# Patient Record
Sex: FEMALE | Race: WHITE | URBAN - METROPOLITAN AREA
[De-identification: names, ages, dates, MRNs, and addresses within clinical notes are randomized per-mention and may not be internally consistent; named-entity substitution may affect disease eponyms.]

---

## 2018-01-05 ENCOUNTER — APPOINTMENT (RX ONLY)
Dept: URBAN - METROPOLITAN AREA CLINIC 20 | Facility: CLINIC | Age: 63
Setting detail: DERMATOLOGY
End: 2018-01-05

## 2018-01-05 DIAGNOSIS — L57.0 ACTINIC KERATOSIS: ICD-10-CM

## 2018-01-05 DIAGNOSIS — D22 MELANOCYTIC NEVI: ICD-10-CM

## 2018-01-05 DIAGNOSIS — L57.8 OTHER SKIN CHANGES DUE TO CHRONIC EXPOSURE TO NONIONIZING RADIATION: ICD-10-CM

## 2018-01-05 DIAGNOSIS — Z85.828 PERSONAL HISTORY OF OTHER MALIGNANT NEOPLASM OF SKIN: ICD-10-CM

## 2018-01-05 DIAGNOSIS — L82.1 OTHER SEBORRHEIC KERATOSIS: ICD-10-CM

## 2018-01-05 DIAGNOSIS — L81.4 OTHER MELANIN HYPERPIGMENTATION: ICD-10-CM

## 2018-01-05 DIAGNOSIS — Z12.83 ENCOUNTER FOR SCREENING FOR MALIGNANT NEOPLASM OF SKIN: ICD-10-CM

## 2018-01-05 DIAGNOSIS — D18.0 HEMANGIOMA: ICD-10-CM

## 2018-01-05 PROBLEM — I10 ESSENTIAL (PRIMARY) HYPERTENSION: Status: ACTIVE | Noted: 2018-01-05

## 2018-01-05 PROBLEM — E13.9 OTHER SPECIFIED DIABETES MELLITUS WITHOUT COMPLICATIONS: Status: ACTIVE | Noted: 2018-01-05

## 2018-01-05 PROBLEM — E78.5 HYPERLIPIDEMIA, UNSPECIFIED: Status: ACTIVE | Noted: 2018-01-05

## 2018-01-05 PROBLEM — D22.5 MELANOCYTIC NEVI OF TRUNK: Status: ACTIVE | Noted: 2018-01-05

## 2018-01-05 PROBLEM — D18.01 HEMANGIOMA OF SKIN AND SUBCUTANEOUS TISSUE: Status: ACTIVE | Noted: 2018-01-05

## 2018-01-05 PROCEDURE — ? COUNSELING

## 2018-01-05 PROCEDURE — 99203 OFFICE O/P NEW LOW 30 MIN: CPT | Mod: 25

## 2018-01-05 PROCEDURE — 17003 DESTRUCT PREMALG LES 2-14: CPT

## 2018-01-05 PROCEDURE — ? LIQUID NITROGEN

## 2018-01-05 PROCEDURE — 17000 DESTRUCT PREMALG LESION: CPT

## 2018-01-05 ASSESSMENT — LOCATION ZONE DERM
LOCATION ZONE: TRUNK
LOCATION ZONE: SCALP
LOCATION ZONE: NOSE
LOCATION ZONE: FACE
LOCATION ZONE: ARM

## 2018-01-05 ASSESSMENT — LOCATION SIMPLE DESCRIPTION DERM
LOCATION SIMPLE: LEFT CHEEK
LOCATION SIMPLE: RIGHT CHEEK
LOCATION SIMPLE: ABDOMEN
LOCATION SIMPLE: NOSE
LOCATION SIMPLE: SCALP
LOCATION SIMPLE: RIGHT UPPER ARM
LOCATION SIMPLE: CHEST
LOCATION SIMPLE: LEFT UPPER ARM
LOCATION SIMPLE: LEFT TEMPLE
LOCATION SIMPLE: RIGHT EYEBROW

## 2018-01-05 ASSESSMENT — LOCATION DETAILED DESCRIPTION DERM
LOCATION DETAILED: RIGHT CENTRAL EYEBROW
LOCATION DETAILED: LEFT ANTERIOR PROXIMAL UPPER ARM
LOCATION DETAILED: PERIUMBILICAL SKIN
LOCATION DETAILED: RIGHT CENTRAL MALAR CHEEK
LOCATION DETAILED: NASAL DORSUM
LOCATION DETAILED: UPPER STERNUM
LOCATION DETAILED: LEFT CENTRAL TEMPLE
LOCATION DETAILED: RIGHT SUPERIOR PARIETAL SCALP
LOCATION DETAILED: RIGHT ANTERIOR PROXIMAL UPPER ARM
LOCATION DETAILED: LEFT CENTRAL MALAR CHEEK

## 2018-01-05 NOTE — PROCEDURE: LIQUID NITROGEN
Detail Level: Detailed
Consent: The patient's consent was obtained including but not limited to risks of crusting, scabbing, blistering, scarring, darker or lighter pigmentary change, recurrence, incomplete removal and infection.
Post-Care Instructions: I reviewed with the patient in detail post-care instructions. Patient is to wear sun protection and avoid picking at any of the treated lesions. Pt may apply Vaseline to crusted or scabbing areas.
Render Post-Care Instructions In Note?: yes
Duration Of Freeze Thaw-Cycle (Seconds): 0

## 2018-03-06 ENCOUNTER — HOSPITAL ENCOUNTER (OUTPATIENT)
Dept: RADIOLOGY | Facility: MEDICAL CENTER | Age: 63
End: 2018-03-06

## 2018-03-19 ENCOUNTER — HOSPITAL ENCOUNTER (OUTPATIENT)
Dept: RADIOLOGY | Facility: MEDICAL CENTER | Age: 63
End: 2018-03-19
Attending: FAMILY MEDICINE
Payer: COMMERCIAL

## 2018-03-19 DIAGNOSIS — Z12.31 ENCOUNTER FOR SCREENING MAMMOGRAM FOR MALIGNANT NEOPLASM OF BREAST: ICD-10-CM

## 2018-03-19 PROCEDURE — 77063 BREAST TOMOSYNTHESIS BI: CPT

## 2018-03-30 ENCOUNTER — TELEPHONE (OUTPATIENT)
Dept: URGENT CARE | Facility: PHYSICIAN GROUP | Age: 63
End: 2018-03-30

## 2018-03-30 ENCOUNTER — OFFICE VISIT (OUTPATIENT)
Dept: URGENT CARE | Facility: PHYSICIAN GROUP | Age: 63
End: 2018-03-30
Payer: COMMERCIAL

## 2018-03-30 VITALS
TEMPERATURE: 99.2 F | HEART RATE: 102 BPM | WEIGHT: 189 LBS | BODY MASS INDEX: 30.37 KG/M2 | OXYGEN SATURATION: 92 % | SYSTOLIC BLOOD PRESSURE: 120 MMHG | HEIGHT: 66 IN | DIASTOLIC BLOOD PRESSURE: 60 MMHG

## 2018-03-30 DIAGNOSIS — H66.91 ACUTE OTITIS MEDIA, RIGHT: Primary | ICD-10-CM

## 2018-03-30 DIAGNOSIS — J06.9 URI, ACUTE: ICD-10-CM

## 2018-03-30 DIAGNOSIS — H66.91 ACUTE OTITIS MEDIA, RIGHT: ICD-10-CM

## 2018-03-30 PROCEDURE — 99203 OFFICE O/P NEW LOW 30 MIN: CPT | Performed by: PHYSICIAN ASSISTANT

## 2018-03-30 RX ORDER — AZITHROMYCIN 250 MG/1
TABLET, FILM COATED ORAL
Qty: 6 TAB | Refills: 0 | Status: SHIPPED | OUTPATIENT
Start: 2018-03-30 | End: 2018-04-24

## 2018-03-30 RX ORDER — AMOXICILLIN 875 MG/1
875 TABLET, COATED ORAL 2 TIMES DAILY
Qty: 20 TAB | Refills: 0 | Status: SHIPPED | OUTPATIENT
Start: 2018-03-30 | End: 2018-04-24

## 2018-03-30 ASSESSMENT — ENCOUNTER SYMPTOMS
SPUTUM PRODUCTION: 0
COUGH: 1
HEADACHES: 1
SORE THROAT: 1
SINUS PAIN: 0
FEVER: 1

## 2018-03-30 NOTE — PROGRESS NOTES
Subjective:      Alisa Pena is a 62 y.o. female who presents with Ear Pain (Bilateral ear pain, cough, congestion, sore throat, x4 days )    PMH:  has a past medical history of Cancer (CMS-HCC) (2009); Diabetes (CMS-HCC); and Hypertension.  MEDS:   Current Outpatient Prescriptions:   •  amoxicillin (AMOXIL) 875 MG tablet, Take 1 Tab by mouth 2 times a day., Disp: 20 Tab, Rfl: 0  •  omeprazole (PRILOSEC) 20 MG delayed-release capsule, Take 20 mg by mouth 2 Times a Day., Disp: , Rfl:   •  metformin (GLUCOPHAGE) 500 MG Tab, Take 500 mg by mouth 2 times a day, with meals., Disp: , Rfl:   •  lisinopril (PRINIVIL) 20 MG Tab, Take 20 mg by mouth every day., Disp: , Rfl:   •  atorvastatin (LIPITOR) 10 MG Tab, Take 10 mg by mouth every evening., Disp: , Rfl:   •  aspirin EC (ECOTRIN) 81 MG Tablet Delayed Response, Take 81 mg by mouth every day., Disp: , Rfl:   ALLERGIES: No Known Allergies  SURGHX:   Past Surgical History:   Procedure Laterality Date   • GANGLION EXCISION Right 12/23/2016    Procedure: GANGLION EXCISION - DORSAL WRIST;  Surgeon: Marquez Jauregui M.D.;  Location: SURGERY [facility];  Service:    • HAND SURGERY Right 2004    thumb and wrist   • HYSTERECTOMY, TOTAL ABDOMINAL  1992   • APPENDECTOMY  1990   • SHOULDER ARTHROSCOPY Right 1998/2002    rotator cuff repair     SOCHX:  reports that she has never smoked. She has never used smokeless tobacco. She reports that she drinks alcohol. She reports that she does not use drugs.  FH:  Reviewed with patient/family. Not pertinent to this complaint.             Otalgia    There is pain in both ears. This is a new problem. The current episode started in the past 7 days. The problem occurs constantly. There has been no fever. Associated symptoms include coughing, headaches and a sore throat. She has tried NSAIDs and heat packs (sudafed) for the symptoms. The treatment provided mild relief.       Review of Systems   Constitutional: Positive for fever.  "  HENT: Positive for congestion, ear pain and sore throat. Negative for sinus pain.    Respiratory: Positive for cough. Negative for sputum production.    Neurological: Positive for headaches.   All other systems reviewed and are negative.         Objective:     /60   Pulse (!) 102   Temp 37.3 °C (99.2 °F)   Ht 1.676 m (5' 6\")   Wt 85.7 kg (189 lb)   SpO2 92%   BMI 30.51 kg/m²      Physical Exam   Constitutional: She is oriented to person, place, and time. She appears well-developed and well-nourished. No distress.   HENT:   Head: Normocephalic and atraumatic.   Right Ear: Tympanic membrane is injected, erythematous and bulging. A middle ear effusion is present.   Left Ear: Tympanic membrane normal.   Nose: Mucosal edema and rhinorrhea present.   Mouth/Throat: Uvula is midline, oropharynx is clear and moist and mucous membranes are normal. No tonsillar exudate.   Eyes: Conjunctivae and EOM are normal. Pupils are equal, round, and reactive to light.   Neck: Normal range of motion. Neck supple.   Cardiovascular: Normal rate.    Pulmonary/Chest: Effort normal and breath sounds normal. She has no wheezes. She has no rhonchi. She has no rales.   Musculoskeletal: Normal range of motion.   Neurological: She is alert and oriented to person, place, and time.   Skin: Skin is warm and dry. Capillary refill takes less than 2 seconds.   Psychiatric: She has a normal mood and affect.   Nursing note and vitals reviewed.         Assessment/Plan:     1. Acute otitis media, right  amoxicillin (AMOXIL) 875 MG tablet   2. URI, acute  amoxicillin (AMOXIL) 875 MG tablet     Motrin/Advil/Ibuprophen 600 mg every 6 hours as needed for pain or fever.    PT can continue OTC medications, increase fluids and rest until symptoms improve.     PT should follow up with PCP in 1-2 days for re-evaluation if symptoms have not improved.  Discussed red flags and reasons to return to UC or ED.  Pt and/or family verbalized understanding of " diagnosis and follow up instructions and was offered informational handout on diagnosis.  PT discharged.

## 2018-03-30 NOTE — LETTER
March 30, 2018         Patient: Alisa Pena   YOB: 1955   Date of Visit: 3/30/2018           To Whom it May Concern:    Alisa Pena was seen in my clinic on 3/30/2018. She may return to work on 04/02/2018.    If you have any questions or concerns, please don't hesitate to call.        Sincerely,           Tory Ray P.A.-C.  Electronically Signed

## 2018-03-30 NOTE — TELEPHONE ENCOUNTER
1. Caller Name: Alisa                                         Call Back Number: [phone]      Patient approves a detailed voicemail message: yes    2. What are the patient's symptoms (location & severity)? Diarrhea    3. Is this a new symptom Yes    4. When did it start? Noon, patient took abx around 10am started to have diarrhea around noon, patient would like to know if she could get something else or would like to know your recommendations. Please advise.

## 2018-04-02 ENCOUNTER — TELEPHONE (OUTPATIENT)
Dept: URGENT CARE | Facility: PHYSICIAN GROUP | Age: 63
End: 2018-04-02

## 2018-04-02 DIAGNOSIS — R05.9 COUGH: Primary | ICD-10-CM

## 2018-04-02 RX ORDER — BENZONATATE 100 MG/1
100 CAPSULE ORAL 3 TIMES DAILY PRN
Qty: 30 CAP | Refills: 0 | Status: SHIPPED | OUTPATIENT
Start: 2018-04-02 | End: 2018-04-24

## 2018-04-02 NOTE — TELEPHONE ENCOUNTER
Pt called and would appreciate if she could get the tessalon pearls.   The correct pharmacy is in the computer.       Thank you !

## 2018-04-02 NOTE — TELEPHONE ENCOUNTER
Pt called and was seen on 03/30, she was offered cough medication but refused it at the time of visit. She states now that the cough has not improved and would I fact like a cough medication.     I spoke to Kirk Lancaster since Tory is out of town.. He stated that we can send in tessalon pearls for her, but would need to be seen if she wanted a cough medication that has codeine in it. Let us know and we can send in the pearls if she wishes.       Thank you !

## 2018-04-11 ENCOUNTER — TELEPHONE (OUTPATIENT)
Dept: MEDICAL GROUP | Facility: LAB | Age: 63
End: 2018-04-11

## 2018-04-11 NOTE — TELEPHONE ENCOUNTER
Phone Number Called: [phone] (home)     Message: I left a message for her letting her know that we are excited that she is establishing care with us. The new patient appointment she has with us is scheduled with Bianca Agustin. Bianca will be leaving our practice in late June and will be moving out of state, she is welcome to establish care with Bianca and re-establish her care with another provider at a later time. Please call and let us know, if we need to reschedule her new patient appointment with another provider.    Left Message for patient to call back: yes

## 2018-04-19 ENCOUNTER — OFFICE VISIT (OUTPATIENT)
Dept: URGENT CARE | Facility: PHYSICIAN GROUP | Age: 63
End: 2018-04-19
Payer: COMMERCIAL

## 2018-04-19 VITALS
OXYGEN SATURATION: 95 % | DIASTOLIC BLOOD PRESSURE: 100 MMHG | HEIGHT: 66 IN | WEIGHT: 191 LBS | TEMPERATURE: 97.7 F | HEART RATE: 102 BPM | SYSTOLIC BLOOD PRESSURE: 146 MMHG | BODY MASS INDEX: 30.7 KG/M2

## 2018-04-19 DIAGNOSIS — R09.82 PND (POST-NASAL DRIP): ICD-10-CM

## 2018-04-19 DIAGNOSIS — R09.81 NASAL CONGESTION WITH RHINORRHEA: ICD-10-CM

## 2018-04-19 DIAGNOSIS — J02.9 SORE THROAT: ICD-10-CM

## 2018-04-19 DIAGNOSIS — J34.89 NASAL CONGESTION WITH RHINORRHEA: ICD-10-CM

## 2018-04-19 DIAGNOSIS — H69.90 EUSTACHIAN TUBE DYSFUNCTION, UNSPECIFIED LATERALITY: ICD-10-CM

## 2018-04-19 DIAGNOSIS — R05.9 COUGH: ICD-10-CM

## 2018-04-19 DIAGNOSIS — J30.89 ACUTE NON-SEASONAL ALLERGIC RHINITIS, UNSPECIFIED TRIGGER: ICD-10-CM

## 2018-04-19 PROCEDURE — 99214 OFFICE O/P EST MOD 30 MIN: CPT | Performed by: NURSE PRACTITIONER

## 2018-04-19 RX ORDER — BENZONATATE 100 MG/1
100 CAPSULE ORAL 3 TIMES DAILY PRN
Qty: 60 CAP | Refills: 0 | Status: SHIPPED | OUTPATIENT
Start: 2018-04-19 | End: 2018-04-24

## 2018-04-19 RX ORDER — FLUTICASONE PROPIONATE 50 MCG
2 SPRAY, SUSPENSION (ML) NASAL DAILY
Qty: 1 BOTTLE | Refills: 0 | Status: SHIPPED | OUTPATIENT
Start: 2018-04-19 | End: 2018-11-16

## 2018-04-19 ASSESSMENT — PAIN SCALES - GENERAL: PAINLEVEL: 8=MODERATE-SEVERE PAIN

## 2018-04-20 NOTE — PROGRESS NOTES
Subjective:      Alisa Pena is a 62 y.o. female who presents with Ear Pain (R ear pain, sore throat x 4 weeks, seen on 03/30 with no relief )            HPI  Right ear pain radiates to right side throat. Sudafed x 1 week. Nasal spray used x 3 days (believes it is Afrin). Severe sinus facial/ear pressure. Denies SOB, chest tightness, wheeze. Dry scratchy throat.    PMH:  has a past medical history of Cancer (CMS-HCC) (2009); Diabetes (CMS-HCC); and Hypertension.  MEDS:   Current Outpatient Prescriptions:   •  omeprazole (PRILOSEC) 20 MG delayed-release capsule, Take 20 mg by mouth 2 Times a Day., Disp: , Rfl:   •  metformin (GLUCOPHAGE) 500 MG Tab, Take 500 mg by mouth 2 times a day, with meals., Disp: , Rfl:   •  lisinopril (PRINIVIL) 20 MG Tab, Take 20 mg by mouth every day., Disp: , Rfl:   •  atorvastatin (LIPITOR) 10 MG Tab, Take 10 mg by mouth every evening., Disp: , Rfl:   •  aspirin EC (ECOTRIN) 81 MG Tablet Delayed Response, Take 81 mg by mouth every day., Disp: , Rfl:   •  benzonatate (TESSALON) 100 MG Cap, Take 1 Cap by mouth 3 times a day as needed for Cough., Disp: 30 Cap, Rfl: 0  •  amoxicillin (AMOXIL) 875 MG tablet, Take 1 Tab by mouth 2 times a day., Disp: 20 Tab, Rfl: 0  •  azithromycin (ZITHROMAX) 250 MG Tab, Take 2 tabs by mouth once today, then one tab by mouth once daily days 2-5.  Complete all medication., Disp: 6 Tab, Rfl: 0  ALLERGIES: No Known Allergies  SURGHX:   Past Surgical History:   Procedure Laterality Date   • GANGLION EXCISION Right 12/23/2016    Procedure: GANGLION EXCISION - DORSAL WRIST;  Surgeon: Marquez Jauregui M.D.;  Location: SURGERY [facility];  Service:    • HAND SURGERY Right 2004    thumb and wrist   • HYSTERECTOMY, TOTAL ABDOMINAL  1992   • APPENDECTOMY  1990   • SHOULDER ARTHROSCOPY Right 1998/2002    rotator cuff repair     SOCHX:  reports that she has never smoked. She has never used smokeless tobacco. She reports that she drinks alcohol. She reports  "that she does not use drugs.  FH: Family history was reviewed, no pertinent findings to report    ROS       Objective:     /100   Pulse (!) 102   Temp 36.5 °C (97.7 °F)   Ht 1.676 m (5' 6\")   Wt 86.6 kg (191 lb)   SpO2 95%   BMI 30.83 kg/m²      Physical Exam            Assessment/Plan:     1. Nasal congestion with rhinorrhea    - fluticasone (FLONASE) 50 MCG/ACT nasal spray; Spray 2 Sprays in nose every day.  Dispense: 1 Bottle; Refill: 0    2. PND (post-nasal drip)    3. Cough    - benzonatate (TESSALON) 100 MG Cap; Take 1 Cap by mouth 3 times a day as needed for Cough.  Dispense: 60 Cap; Refill: 0    4. Sore throat    5. Eustachian tube dysfunction, unspecified laterality    6. Acute non-seasonal allergic rhinitis, unspecified trigger    D/c Afrin  Increase water intake  Get rest  May use Ibuprofen/Tylenol prn for any fever, ear or throat pain  May take longer acting antihistamine for seasonal allergy symptoms prn  May use saline nasal spray for nasal congestion  May use Flonase for allergen nasal congestion  May gargle with salt water prn for throat discomfort  May drink smoothies for nutrition if too painful to swallow solid foods  Monitor for productive cough, SOB and chest pain/tightness- need re-evaluation          "

## 2018-04-23 ENCOUNTER — TELEPHONE (OUTPATIENT)
Dept: MEDICAL GROUP | Facility: LAB | Age: 63
End: 2018-04-23

## 2018-04-23 RX ORDER — CLOBETASOL PROPIONATE 0.5 MG/G
CREAM TOPICAL
COMMUNITY
Start: 2018-04-17 | End: 2018-11-16

## 2018-04-23 NOTE — TELEPHONE ENCOUNTER
NEW PATIENT VISIT PRE-VISIT PLANNING    1.  [employer] Patient is checked in Patient Demographics? YES    2.  Immunizations were updated in [facility] using WebIZ?: Yes       •  Web Iz Recommendations: TDAP and ZOSTAVAX (Shingles)    3.  Is this appointment scheduled as a Hospital Follow-Up? No    4.  Patient is due for the following Health Maintenance Topics:   Health Maintenance Due   Topic Date Due   • IMM DTaP/Tdap/Td Vaccine (1 - Tdap) 09/28/1976   • PAP SMEAR  09/28/1978   • COLONOSCOPY  09/28/2007           5.  Reviewed/Updated the following with patient:   •   Preferred Pharmacy? NO       •   Preferred Lab? NO       •   Preferred Communication? NO       •   Allergies? NO       •   Medications? NO       •   Social History? NO       •   Family History (document living status of immediate family members and if + hx of cancer, diabetes, hypertension, hyperlipidemia, heart attack, stroke) NO    6.  Updated Care Team?       •   DME Company (gait device, O2, CPAP, etc.) NO       •   Other Specialists (eye doctor, derm, GYN, cardiology, endo, etc): NO    7.  MDX printed for Provider? NO    8.  Patient was NOT informed to arrive 15 min prior to their   scheduled appointment and bring in their medication bottles.

## 2018-04-24 ENCOUNTER — OFFICE VISIT (OUTPATIENT)
Dept: MEDICAL GROUP | Facility: LAB | Age: 63
End: 2018-04-24
Payer: COMMERCIAL

## 2018-04-24 VITALS
RESPIRATION RATE: 12 BRPM | HEART RATE: 90 BPM | BODY MASS INDEX: 30.22 KG/M2 | HEIGHT: 66 IN | OXYGEN SATURATION: 95 % | SYSTOLIC BLOOD PRESSURE: 128 MMHG | WEIGHT: 188 LBS | TEMPERATURE: 97.8 F | DIASTOLIC BLOOD PRESSURE: 78 MMHG

## 2018-04-24 DIAGNOSIS — L30.9 ECZEMA OF BOTH HANDS: ICD-10-CM

## 2018-04-24 DIAGNOSIS — K22.70 BARRETT'S ESOPHAGUS WITHOUT DYSPLASIA: ICD-10-CM

## 2018-04-24 DIAGNOSIS — R09.81 SINUS CONGESTION: ICD-10-CM

## 2018-04-24 DIAGNOSIS — E11.9 TYPE 2 DIABETES MELLITUS WITHOUT COMPLICATION, WITHOUT LONG-TERM CURRENT USE OF INSULIN (HCC): ICD-10-CM

## 2018-04-24 DIAGNOSIS — E78.5 DYSLIPIDEMIA: ICD-10-CM

## 2018-04-24 DIAGNOSIS — I10 ESSENTIAL HYPERTENSION: ICD-10-CM

## 2018-04-24 PROBLEM — Z00.00 PREVENTATIVE HEALTH CARE: Status: ACTIVE | Noted: 2018-04-24

## 2018-04-24 PROCEDURE — 99215 OFFICE O/P EST HI 40 MIN: CPT | Performed by: PHYSICIAN ASSISTANT

## 2018-04-24 RX ORDER — DOXYCYCLINE HYCLATE 100 MG
100 TABLET ORAL 2 TIMES DAILY
Qty: 14 TAB | Refills: 0 | Status: SHIPPED | OUTPATIENT
Start: 2018-04-24 | End: 2018-05-01

## 2018-04-24 RX ORDER — DOXYCYCLINE HYCLATE 100 MG
100 TABLET ORAL 2 TIMES DAILY
Qty: 14 TAB | Refills: 0 | Status: SHIPPED | OUTPATIENT
Start: 2018-04-24 | End: 2018-04-24 | Stop reason: SDUPTHER

## 2018-04-24 ASSESSMENT — PAIN SCALES - GENERAL: PAINLEVEL: 4=SLIGHT-MODERATE PAIN

## 2018-04-24 ASSESSMENT — PATIENT HEALTH QUESTIONNAIRE - PHQ9: CLINICAL INTERPRETATION OF PHQ2 SCORE: 0

## 2018-04-24 NOTE — ASSESSMENT & PLAN NOTE
Dx around 1995.   Treating with atorvastatin 10mg nightly.  Has been changed 3 times per pt. This one works the best for her.   Diet: likes her fruits and veggies, tries to do well.   Exercise: not enough per pt.   Denies tobacco use.   Denies chest pain, shortness of breath, lightheadedness,   Will get muscle cramping about 1 time monthly just since moving to [place].

## 2018-04-24 NOTE — PROGRESS NOTES
Chief Complaint   Patient presents with   • Establish Care     HPI:   Alisa Pena is a 62 y.o. female here to establish care and discuss sinus symptoms, eczema, cholesterol, Broussard's, diabetes    Has had cold sx since 3/30/18   She has been on z pack then amoxicillin   At first had nasal congestion, ear popping, intermittent right ear pain, sore throat, cough, fatigue.   Was treating with sudefed and decongestants- then when she was seen at urgent care- was given Z-Brett then followed up and was given amoxicillin then was seen in the third time and was told to stop her Sudafed and decongestants and switched to flonase, sinus rinse, claritin.   Stated she is feeling much more loose mucus much pressure although she does have a lot of pain on her right maxillary sinus and right frontal sinus cavity, worse with leaning forward, sometimes warm, now she is getting intermittent feverish (has not been checking temperature) right ear still intermittent pain  Denies chills, dizziness, changes with vision.   History of allergies  No tobacco use    Eczema of both hands  Dx several years ago  Was worse in [place].   treated with clobetasol 0.05% cream, worked great although has needed this since living in [place].     Dyslipidemia  Dx around 1995.   Treating with atorvastatin 10mg nightly.  Has been changed 3 times per pt. This one works the best for her.   Diet: likes her fruits and veggies, tries to do well.   Exercise: not enough per pt.   Denies tobacco use.   Denies chest pain, shortness of breath, lightheadedness,   Will get muscle cramping about 1 time monthly just since moving to [place].       Broussard esophagus  2009, had EGD every 2 years then switched to every 3 years. Due 2018.   Treating with prilosec 20mg BID, always remembers in the am. Sometimes forgets the pm dosage.   Denies CP, SOB, coughing, abdominal pain, D, tarry stools.     Essential hypertension  Dx around about 10 years ago.   Treating with lisinopril  20mg qd. Also takes ASA daily.   Pt stated has always been normal when going in to clinic so she stopped taking her BP at home.   Recently was at urgent care but BP was a little elevated but stated she was taking lots of decongestants.   Denies HA, blurry vision, CP, SOB, dizziness, light headedness, leg swelling    Type 2 diabetes mellitus without complication (CMS-HCC)  Lost 50# in 2016 and A1c decreased.   treating with metformin 500mg BID  Last A1c: unsure but 12/20/16 FBG 96.   ACE: lisinopril   Statin: atorvastatin   ASA: daily  Diet: tries to stay healthy  Exercise: stated this could be better.   Checks feet: yes, no sores  Eye exam: 1 year ago, denies retinopathy  Kidney function: 12/20/16 normal   Last microalbumin creatine urine ratio: unknown  Denies polyuria, polydipsia, numbness or tingling in extremities      Current medicines (including changes today)  Current Outpatient Prescriptions   Medication Sig Dispense Refill   • doxycycline (VIBRAMYCIN) 100 MG Tab Take 1 Tab by mouth 2 times a day for 7 days. 14 Tab 0   • clobetasol (TEMOVATE) 0.05 % Cream      • fluticasone (FLONASE) 50 MCG/ACT nasal spray Spray 2 Sprays in nose every day. 1 Bottle 0   • omeprazole (PRILOSEC) 20 MG delayed-release capsule Take 20 mg by mouth 2 Times a Day.     • metformin (GLUCOPHAGE) 500 MG Tab Take 500 mg by mouth 2 times a day, with meals.     • lisinopril (PRINIVIL) 20 MG Tab Take 20 mg by mouth every day.     • atorvastatin (LIPITOR) 10 MG Tab Take 10 mg by mouth every evening.     • aspirin EC (ECOTRIN) 81 MG Tablet Delayed Response Take 81 mg by mouth every day.       No current facility-administered medications for this visit.      She  has a past medical history of Cancer (CMS-HCC) (2009); Diabetes (CMS-HCC); and Hypertension.  She  has a past surgical history that includes appendectomy (1990); hysterectomy, total abdominal (1992); shoulder arthroscopy (Right, 1998/2002); hand surgery (Right, 2004); and ganglion  "excision (Right, 2016).  Social History   Substance Use Topics   • Smoking status: Never Smoker   • Smokeless tobacco: Never Used   • Alcohol use Yes      Comment: 1 per week     Social History     Social History Narrative   • No narrative on file     Family History   Problem Relation Age of Onset   • Diabetes Mother    • Cancer Mother      breast   • Lung Disease Father      COPD   • Diabetes Sister    • Heart Disease Neg Hx    • Stroke Neg Hx      Family Status   Relation Status   • Mother    • Father    • Sister Alive   • Brother    • Son Alive   • Daughter Alive   • Neg Hx        ROS  Constitutional: Negative for fever, chills, weight loss  HENT: + R ear pain, nosebleeds, + congestion, sore throat and neg neck pain.   Eyes: Negative for blurred vision.   Respiratory: + cough, sputum production, neg shortness of breath and wheezing.   Cardiovascular: Negative for chest pain, palpitations, orthopnea and leg swelling.   Gastrointestinal: Negative for heartburn, nausea, vomiting and abdominal pain.   Genitourinary: Negative for dysuria, urgency and frequency.   Musculoskeletal: Negative for myalgias, back pain and joint pain.   Skin: Negative for rash and itching.   Neurological: Negative for dizziness, tingling, tremors, sensory change, focal weakness and headaches.   Endo/Heme/Allergies: Does not bruise/bleed easily.   Psychiatric/Behavioral: Negative for depression, anxiety, or memory loss.   All other systems reviewed and are negative except as in HPI.     Objective:     Blood pressure 128/78, pulse 90, temperature 36.6 °C (97.8 °F), resp. rate 12, height 1.676 m (5' 6\"), weight 85.3 kg (188 lb), SpO2 95 %. Body mass index is 30.34 kg/m².  Physical Exam:    Constitutional: Alert, no distress.  Skin: Warm, dry, good turgor, no rashes in visible areas.  Eye: PERRLA, conjunctiva clear, lids normal.  ENMT: TM normal bilaterally, Lips without lesions, good dentition, oropharynx with very " mild erythematous injections, nares patent, nasal determinants slightly edematous with thick white mucus noted. Moderate tenderness over right frontal sinus cavity and right maxillary cavity.   Neck: Trachea midline, no masses, no thyromegaly.  Respiratory: Unlabored respiratory effort, lungs clear to auscultation, no wheezes, no ronchi.  Cardiovascular: Normal S1, S2, no murmur, no edema.  Abdomen: Soft, non-tender, no masses, no hepatosplenomegaly.  Psych: Alert and oriented x3, normal affect and mood.    Assessment and Plan:   The following treatment plan was discussed     1. Eczema of both hands  No rash noted today, continue to keep hands moist, has steroid cream on hand when needed    2. Dyslipidemia  Labs ordered, continue current regimen and working on diet and exercise.   - COMP METABOLIC PANEL; Future  - LIPID PROFILE; Future    3. Broussard's esophagus without dysplasia  Stable per patient, continue following with GI and taking Prilosec, new referral placed  - REFERRAL TO GASTROENTEROLOGY    4. Essential hypertension  Controlled, continue current regimen, labs ordered, will call for results  - CBC WITH DIFFERENTIAL; Future    5. Type 2 diabetes mellitus without complication, without long-term current use of insulin (CMS-[facility])  Labs ordered, continue current regimen and working on diet and exercise  Continue following with ophthalmology yearly, records requested  - COMP METABOLIC PANEL; Future  - HEMOGLOBIN A1C; Future  - MICROALBUMIN CREAT RATIO URINE; Future  - LIPID PROFILE; Future    6. Sinus congestion  Likely has sinus infection since pressure and pain in one side of face with fevers although no fever present today.  Continue with flonase, sinus rinse, increase water intake. Recommend switching from Claritin to Zyrtec or a different antihistamine.  May also try Mucinex and restart decongestant since now she is not only on decongestants and her mucus is thinner.   Did give wait-and-see antibiotic-  doxycycline 100mg BID x 7d.  Side effects discussed in depth.    Greater than 50% of 45 minutes was spent in face-to-face care and coordination regarding Broussard's, hypertension, diabetes, eczema, hyperlipidemia, sinus issues.    Records requested.  Followup: Return in about 3 months (around 7/24/2018) for chronic issues.           Please note that this dictation was created using voice recognition software. I have made every reasonable attempt to correct obvious errors, but I expect that there are errors of grammar and possibly content that I did not discover before finalizing the note.

## 2018-04-24 NOTE — ASSESSMENT & PLAN NOTE
Lost 50# in 2016 and A1c decreased.   treating with metformin 500mg BID  Last A1c: unsure but 12/20/16 FBG 96.   ACE: lisinopril   Statin: atorvastatin   ASA: daily  Diet: tries to stay healthy  Exercise: stated this could be better.   Checks feet: yes, no sores  Eye exam: 1 year ago, denies retinopathy  Kidney function: 12/20/16 normal   Last microalbumin creatine urine ratio: unknown  Denies polyuria, polydipsia, numbness or tingling in extremities

## 2018-04-24 NOTE — LETTER
[facility]  Pcp Pt States None  No address on file  Fax: [phone]   Authorization for Release/Disclosure of   Protected Health Information   Name: RHONDA PENA : 1955 SSN: [id number]   Address: [address]  [address]  [place] [address] Phone:    [phone] (home)    I authorize the entity listed below to release/disclose the PHI below to:   [facility]/Pcp Pt States None and Bianca Agustin P.A.-C.   Provider or Entity Name:  [facility]    Address   City, [facility], [facility]   Phone:      Fax:     Reason for request: continuity of care   Information to be released:    [  ] LAST COLONOSCOPY,  including any PATH REPORT and follow-up  [  ] LAST FIT/COLOGUARD RESULT [  ] LAST DEXA  [  ] LAST MAMMOGRAM  [  ] LAST PAP  [  ] LAST LABS [  ] RETINA EXAM REPORT  [  ] IMMUNIZATION RECORDS  [  ] Release all info      [  ] Check here and initial the line next to each item to release ALL health information INCLUDING  _____ Care and treatment for drug and / or alcohol abuse  _____ HIV testing, infection status, or AIDS  _____ Genetic Testing    DATES OF SERVICE OR TIME PERIOD TO BE DISCLOSED: _____________  I understand and acknowledge that:  * This Authorization may be revoked at any time by you in writing, except if your health information has already been used or disclosed.  * Your health information that will be used or disclosed as a result of you signing this authorization could be re-disclosed by the recipient. If this occurs, your re-disclosed health information may no longer be protected by State or Federal laws.  * You may refuse to sign this Authorization. Your refusal will not affect your ability to obtain treatment.  * This Authorization becomes effective upon signing and will  on (date) __________.      If no date is indicated, this Authorization will  one (1) year from the signature date.    Name: Rhonda Pena    Signature:   Date:     2018       PLEASE  FAX REQUESTED RECORDS BACK TO: [phone]

## 2018-04-24 NOTE — LETTER
Formerly [facility], [facility]  Pcp Pt States None  No address on file  Fax: [phone]   Authorization for Release/Disclosure of   Protected Health Information   Name: RHONDA PENA : 1955 SSN: [id number]   Address: [address]  [address]  [place] [address] Phone:    [phone] (home)    I authorize the entity listed below to release/disclose the PHI below to:   [facility], [facility]/Pcp Pt States None and Bianca Agustin P.A.-C.   Provider or Entity Name:  Dr. cortez   Address   City, [facility], [facility]   Phone:      Fax:     Reason for request: continuity of care   Information to be released:    [  ] LAST COLONOSCOPY,  including any PATH REPORT and follow-up  [  ] LAST FIT/COLOGUARD RESULT [  ] LAST DEXA  [  ] LAST MAMMOGRAM  [  ] LAST PAP  [  ] LAST LABS [  ] RETINA EXAM REPORT  [  ] IMMUNIZATION RECORDS  [XX] Release all info      [  ] Check here and initial the line next to each item to release ALL health information INCLUDING  _____ Care and treatment for drug and / or alcohol abuse  _____ HIV testing, infection status, or AIDS  _____ Genetic Testing    DATES OF SERVICE OR TIME PERIOD TO BE DISCLOSED: _____________  I understand and acknowledge that:  * This Authorization may be revoked at any time by you in writing, except if your health information has already been used or disclosed.  * Your health information that will be used or disclosed as a result of you signing this authorization could be re-disclosed by the recipient. If this occurs, your re-disclosed health information may no longer be protected by State or Federal laws.  * You may refuse to sign this Authorization. Your refusal will not affect your ability to obtain treatment.  * This Authorization becomes effective upon signing and will  on (date) __________.      If no date is indicated, this Authorization will  one (1) year from the signature date.    Name: Rhonda Pena    Signature:   Date:     2018       PLEASE FAX  REQUESTED RECORDS BACK TO: [phone]

## 2018-04-24 NOTE — ASSESSMENT & PLAN NOTE
Dx around about 10 years ago.   Treating with lisinopril 20mg qd. Also takes ASA daily.   Pt stated has always been normal when going in to clinic so she stopped taking her BP at home.   Recently was at urgent care but BP was a little elevated but stated she was taking lots of decongestants.   Denies HA, blurry vision, CP, SOB, dizziness, light headedness, leg swelling

## 2018-04-24 NOTE — ASSESSMENT & PLAN NOTE
2009, had EGD every 2 years then switched to every 3 years. Due 2018.   Treating with prilosec 20mg BID, always remembers in the am. Sometimes forgets the pm dosage.   Denies CP, SOB, coughing, abdominal pain, D, tarry stools.

## 2018-04-24 NOTE — ASSESSMENT & PLAN NOTE
Dx several years ago  Was worse in [place].   treated with clobetasol 0.05% cream, worked great although has needed this since living in [place].

## 2018-05-22 ENCOUNTER — TELEPHONE (OUTPATIENT)
Dept: MEDICAL GROUP | Facility: LAB | Age: 63
End: 2018-05-22

## 2018-05-22 NOTE — TELEPHONE ENCOUNTER
ESTABLISHED PATIENT PRE-VISIT PLANNING     Note: Patient will not be contacted if there is no indication to call.     1.  Reviewed notes from the last few office visits within the medical group: Yes    2.  If any orders were placed at last visit or intended to be done for this visit (i.e. 6 mos follow-up), do we have Results/Consult Notes?        •  Labs - Labs ordered, NOT completed. Patient advised to complete prior to next appointment.       •  Imaging - Imaging was not ordered at last office visit.       •  Referrals - No referrals were ordered at last office visit.    3. Is this appointment scheduled as a Hospital Follow-Up? No    4.  Immunizations were updated in Epic using WebIZ?: Epic matches WebIZ       •  Web Iz Recommendations: PNEUMOVAX (PPSV23) and TDAP    5.  Patient is due for the following Health Maintenance Topics:   Health Maintenance Due   Topic Date Due   • IMM HEP B VACCINE (1 of 3 - Primary Series) 1955   • A1C SCREENING  03/28/1958   • DIABETES MONOFILAMENT / LE EXAM  03/28/1958   • RETINAL SCREENING  09/28/1975   • FASTING LIPID PROFILE  09/28/1975   • URINE ACR / MICROALBUMIN  09/28/1975   • IMM DTaP/Tdap/Td Vaccine (1 - Tdap) 09/28/1976   • IMM PNEUMOCOCCAL 19-64 (ADULT) MEDIUM RISK SERIES (1 of 1 - PPSV23) 09/28/1976   • PAP SMEAR  09/28/1978   • COLONOSCOPY  09/28/2007   • SERUM CREATININE  12/20/2017       - Patient has completed FLU Immunization(s) per WebIZ. Chart has been updated.    6.  MDX printed for Provider? NO    7.  Patient was NOT informed to arrive 15 min prior to their scheduled appointment and bring in their medication bottles.

## 2018-05-23 ENCOUNTER — OFFICE VISIT (OUTPATIENT)
Dept: MEDICAL GROUP | Facility: LAB | Age: 63
End: 2018-05-23
Payer: COMMERCIAL

## 2018-05-23 VITALS
HEART RATE: 85 BPM | WEIGHT: 194 LBS | RESPIRATION RATE: 12 BRPM | DIASTOLIC BLOOD PRESSURE: 76 MMHG | BODY MASS INDEX: 31.18 KG/M2 | OXYGEN SATURATION: 97 % | TEMPERATURE: 97.8 F | SYSTOLIC BLOOD PRESSURE: 118 MMHG | HEIGHT: 66 IN

## 2018-05-23 DIAGNOSIS — M79.604 RIGHT LEG PAIN: ICD-10-CM

## 2018-05-23 DIAGNOSIS — D17.23 LIPOMA OF RIGHT LOWER EXTREMITY: ICD-10-CM

## 2018-05-23 PROCEDURE — 99214 OFFICE O/P EST MOD 30 MIN: CPT | Performed by: NURSE PRACTITIONER

## 2018-05-23 NOTE — PROGRESS NOTES
"Chief Complaint   Patient presents with   • Leg Pain     pt states she is haing right leg pain, onset 1 month        HPI  Alisa is a 62-year-old established female here with complaint of new onset right leg pain.  Her right leg pain has been present for 1 month.  Denies any injuries or trauma.  Denies hip or back pain.  She points to her proximal anterior thigh and states that her pain radiates down just past her knee.  The pain is worse at night and she is unable to sleep on her right side.  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  She denies any systemic symptoms such as weight loss, fevers, chills, nausea, vomiting, diarrhea, constipation or body aches.  Otherwise she states that she has been doing well.  She has not taken anything for her pain.  Denies any recent airplane travel or long car trips.      Past medical, surgical, family, and social history is reviewed and updated in Epic chart by me today.   Medications and allergies reviewed and updated in Epic chart by me today.     ROS:   As documented in history of present illness above    Exam:  Blood pressure 118/76, pulse 85, temperature 36.6 °C (97.8 °F), resp. rate 12, height 1.676 m (5' 6\"), weight 88 kg (194 lb), SpO2 97 %.  Constitutional: Alert, no distress, plus 3 vital signs  Skin:  Warm, dry, no rashes invisible areas  Eye: Equal, round and reactive, conjunctiva clear  ENMT: Lips without lesions, good dentition, oropharynx clear    Neck: Trachea midline, no masses, no thyromegaly  Respiratory: Unlabored respiration, lungs clear to auscultation, no wheezes, no rhonchi  Cardiovascular: Normal rate and rhythm, no murmur, no edema  Abdomen: Soft, nontender  Musculoskeletal: She has a soft, somewhat tender lipoma feeling cyst to her proximal, anterior right thigh that measures a few centimeters and has no overlying bruising or erythema.  She is also tender to most of her right IT band.  She has full range of motion to her right hip and lower " extremity.  She has no right calf pain, erythema or swelling.  Psych: Alert, pleasant, well-groomed, normal affect    A/P:  1. Right leg pain  -new issue.  Recommend x-ray of her femur due to new onset pain with no trauma.  Discussed an anti-inflammatory for 1 week and then potentially seeing orthopedics if pain persists.  - DX-FEMUR-2+ RIGHT; Future    2. Lipoma of right lower extremity  -Discussed benign nature but I did discuss with her the possibility that this lipoma is pushing on soft tissue, potentially causing her pain and we could consider referral for removal.

## 2018-05-24 ENCOUNTER — HOSPITAL ENCOUNTER (OUTPATIENT)
Dept: RADIOLOGY | Facility: MEDICAL CENTER | Age: 63
End: 2018-05-24
Attending: NURSE PRACTITIONER
Payer: COMMERCIAL

## 2018-05-24 DIAGNOSIS — M79.604 RIGHT LEG PAIN: ICD-10-CM

## 2018-05-24 PROCEDURE — 73552 X-RAY EXAM OF FEMUR 2/>: CPT | Mod: RT

## 2018-06-11 ENCOUNTER — TELEPHONE (OUTPATIENT)
Dept: MEDICAL GROUP | Facility: LAB | Age: 63
End: 2018-06-11

## 2018-06-11 DIAGNOSIS — H10.33 ACUTE BACTERIAL CONJUNCTIVITIS OF BOTH EYES: ICD-10-CM

## 2018-06-11 RX ORDER — TOBRAMYCIN 3 MG/ML
1 SOLUTION/ DROPS OPHTHALMIC EVERY 4 HOURS
Qty: 1 BOTTLE | Refills: 0 | Status: SHIPPED | OUTPATIENT
Start: 2018-06-11 | End: 2018-11-16

## 2018-06-11 NOTE — TELEPHONE ENCOUNTER
Patient called and states that she has been recently traveling by airplane. She woke up today with painful red itchy eye with discharge and was hoping that a prescription for conjunctivitis can be sent to the pharmacy. [facility] preferred pharmacy  Please advise

## 2018-06-28 ENCOUNTER — HOSPITAL ENCOUNTER (OUTPATIENT)
Dept: LAB | Facility: MEDICAL CENTER | Age: 63
End: 2018-06-28
Attending: INTERNAL MEDICINE
Payer: COMMERCIAL

## 2018-06-28 ENCOUNTER — HOSPITAL ENCOUNTER (OUTPATIENT)
Dept: LAB | Facility: MEDICAL CENTER | Age: 63
End: 2018-06-28
Attending: PHYSICIAN ASSISTANT
Payer: COMMERCIAL

## 2018-06-28 DIAGNOSIS — E78.5 DYSLIPIDEMIA: ICD-10-CM

## 2018-06-28 DIAGNOSIS — E11.9 TYPE 2 DIABETES MELLITUS WITHOUT COMPLICATION, WITHOUT LONG-TERM CURRENT USE OF INSULIN (HCC): ICD-10-CM

## 2018-06-28 DIAGNOSIS — I10 ESSENTIAL HYPERTENSION: ICD-10-CM

## 2018-06-28 LAB
25(OH)D3 SERPL-MCNC: 25 NG/ML (ref 30–100)
ALBUMIN SERPL BCP-MCNC: 4.1 G/DL (ref 3.2–4.9)
ALBUMIN/GLOB SERPL: 1.3 G/DL
ALP SERPL-CCNC: 61 U/L (ref 30–99)
ALT SERPL-CCNC: 14 U/L (ref 2–50)
ANION GAP SERPL CALC-SCNC: 10 MMOL/L (ref 0–11.9)
AST SERPL-CCNC: 18 U/L (ref 12–45)
BASOPHILS # BLD AUTO: 1 % (ref 0–1.8)
BASOPHILS # BLD: 0.06 K/UL (ref 0–0.12)
BILIRUB SERPL-MCNC: 0.5 MG/DL (ref 0.1–1.5)
BUN SERPL-MCNC: 17 MG/DL (ref 8–22)
CALCIUM SERPL-MCNC: 9.5 MG/DL (ref 8.5–10.5)
CHLORIDE SERPL-SCNC: 107 MMOL/L (ref 96–112)
CHOLEST SERPL-MCNC: 191 MG/DL (ref 100–199)
CO2 SERPL-SCNC: 25 MMOL/L (ref 20–33)
CREAT SERPL-MCNC: 0.69 MG/DL (ref 0.5–1.4)
CREAT UR-MCNC: 62.8 MG/DL
EOSINOPHIL # BLD AUTO: 0.14 K/UL (ref 0–0.51)
EOSINOPHIL NFR BLD: 2.3 % (ref 0–6.9)
ERYTHROCYTE [DISTWIDTH] IN BLOOD BY AUTOMATED COUNT: 41.8 FL (ref 35.9–50)
EST. AVERAGE GLUCOSE BLD GHB EST-MCNC: 128 MG/DL
FOLATE SERPL-MCNC: 17.4 NG/ML
GLOBULIN SER CALC-MCNC: 3.1 G/DL (ref 1.9–3.5)
GLUCOSE SERPL-MCNC: 103 MG/DL (ref 65–99)
HBA1C MFR BLD: 6.1 % (ref 0–5.6)
HCT VFR BLD AUTO: 39.7 % (ref 37–47)
HCV AB SER QL: NEGATIVE
HDLC SERPL-MCNC: 62 MG/DL
HGB BLD-MCNC: 13.8 G/DL (ref 12–16)
IMM GRANULOCYTES # BLD AUTO: 0.02 K/UL (ref 0–0.11)
IMM GRANULOCYTES NFR BLD AUTO: 0.3 % (ref 0–0.9)
LDLC SERPL CALC-MCNC: 101 MG/DL
LYMPHOCYTES # BLD AUTO: 2.11 K/UL (ref 1–4.8)
LYMPHOCYTES NFR BLD: 34.8 % (ref 22–41)
MAGNESIUM SERPL-MCNC: 1.8 MG/DL (ref 1.5–2.5)
MCH RBC QN AUTO: 32.5 PG (ref 27–33)
MCHC RBC AUTO-ENTMCNC: 34.8 G/DL (ref 33.6–35)
MCV RBC AUTO: 93.4 FL (ref 81.4–97.8)
MICROALBUMIN UR-MCNC: <0.7 MG/DL
MICROALBUMIN/CREAT UR: NORMAL MG/G (ref 0–30)
MONOCYTES # BLD AUTO: 0.43 K/UL (ref 0–0.85)
MONOCYTES NFR BLD AUTO: 7.1 % (ref 0–13.4)
NEUTROPHILS # BLD AUTO: 3.3 K/UL (ref 2–7.15)
NEUTROPHILS NFR BLD: 54.5 % (ref 44–72)
NRBC # BLD AUTO: 0 K/UL
NRBC BLD-RTO: 0 /100 WBC
PLATELET # BLD AUTO: 267 K/UL (ref 164–446)
PMV BLD AUTO: 10.4 FL (ref 9–12.9)
POTASSIUM SERPL-SCNC: 4.3 MMOL/L (ref 3.6–5.5)
PROT SERPL-MCNC: 7.2 G/DL (ref 6–8.2)
RBC # BLD AUTO: 4.25 M/UL (ref 4.2–5.4)
SODIUM SERPL-SCNC: 142 MMOL/L (ref 135–145)
TRIGL SERPL-MCNC: 141 MG/DL (ref 0–149)
VIT B12 SERPL-MCNC: 623 PG/ML (ref 211–911)
WBC # BLD AUTO: 6.1 K/UL (ref 4.8–10.8)

## 2018-06-28 PROCEDURE — 86803 HEPATITIS C AB TEST: CPT

## 2018-06-28 PROCEDURE — 80061 LIPID PANEL: CPT

## 2018-06-28 PROCEDURE — 83735 ASSAY OF MAGNESIUM: CPT

## 2018-06-28 PROCEDURE — 82306 VITAMIN D 25 HYDROXY: CPT

## 2018-06-28 PROCEDURE — 83036 HEMOGLOBIN GLYCOSYLATED A1C: CPT

## 2018-06-28 PROCEDURE — 82607 VITAMIN B-12: CPT

## 2018-06-28 PROCEDURE — 82746 ASSAY OF FOLIC ACID SERUM: CPT

## 2018-06-28 PROCEDURE — 80053 COMPREHEN METABOLIC PANEL: CPT

## 2018-06-28 PROCEDURE — 85025 COMPLETE CBC W/AUTO DIFF WBC: CPT

## 2018-06-28 PROCEDURE — 82570 ASSAY OF URINE CREATININE: CPT

## 2018-06-28 PROCEDURE — 36415 COLL VENOUS BLD VENIPUNCTURE: CPT

## 2018-06-28 PROCEDURE — 82043 UR ALBUMIN QUANTITATIVE: CPT

## 2018-07-22 ENCOUNTER — HOSPITAL ENCOUNTER (OUTPATIENT)
Dept: RADIOLOGY | Facility: MEDICAL CENTER | Age: 63
End: 2018-07-22
Attending: PHYSICIAN ASSISTANT
Payer: COMMERCIAL

## 2018-07-22 ENCOUNTER — OFFICE VISIT (OUTPATIENT)
Dept: URGENT CARE | Facility: PHYSICIAN GROUP | Age: 63
End: 2018-07-22
Payer: COMMERCIAL

## 2018-07-22 VITALS
BODY MASS INDEX: 31.18 KG/M2 | HEIGHT: 66 IN | TEMPERATURE: 97.9 F | WEIGHT: 194 LBS | SYSTOLIC BLOOD PRESSURE: 150 MMHG | DIASTOLIC BLOOD PRESSURE: 86 MMHG | OXYGEN SATURATION: 92 % | HEART RATE: 72 BPM | RESPIRATION RATE: 14 BRPM

## 2018-07-22 DIAGNOSIS — S63.502A SPRAIN OF LEFT WRIST, INITIAL ENCOUNTER: ICD-10-CM

## 2018-07-22 PROCEDURE — 99213 OFFICE O/P EST LOW 20 MIN: CPT | Performed by: PHYSICIAN ASSISTANT

## 2018-07-22 PROCEDURE — 73110 X-RAY EXAM OF WRIST: CPT | Mod: LT

## 2018-07-22 ASSESSMENT — ENCOUNTER SYMPTOMS
VOMITING: 0
TINGLING: 0
CHILLS: 0
SENSORY CHANGE: 0
BRUISES/BLEEDS EASILY: 1
NAUSEA: 0
FEVER: 0
SHORTNESS OF BREATH: 0

## 2018-07-22 ASSESSMENT — PAIN SCALES - GENERAL: PAINLEVEL: 8=MODERATE-SEVERE PAIN

## 2018-07-22 NOTE — PROGRESS NOTES
"Subjective:   Alisa Pena is a 62 y.o. female who presents for Wrist Injury (x1day fell on L wrist, swelling pain)        Wrist Injury    The incident occurred 12 to 24 hours ago. The incident occurred in the yard. The injury mechanism was a fall. The pain is present in the left wrist. The quality of the pain is described as aching. The pain does not radiate. The pain is mild. Pertinent negatives include no tingling. She has tried NSAIDs, ice and rest for the symptoms. The treatment provided mild relief.     Patient suffered FOOSH type injury, landing on left wrist yesterday while working in her yard at home. Denies past medical history of surgery or significant injury to left wrist. Denies crepitus. Complains of pain with range of motion.    Review of Systems   Constitutional: Negative for chills and fever.   Respiratory: Negative for shortness of breath.    Gastrointestinal: Negative for nausea and vomiting.   Musculoskeletal: Positive for joint pain ( POS for left wrist pain).   Skin: Negative for rash.   Neurological: Negative for tingling and sensory change.   Endo/Heme/Allergies: Bruises/bleeds easily ( baby aspirin).     No Known Allergies   Objective:   /86   Pulse 72   Temp 36.6 °C (97.9 °F)   Resp 14   Ht 1.676 m (5' 6\")   Wt 88 kg (194 lb)   SpO2 92%   BMI 31.31 kg/m²   Physical Exam   Constitutional: She is oriented to person, place, and time. She appears well-developed and well-nourished. No distress.   HENT:   Head: Normocephalic and atraumatic.   Right Ear: External ear normal.   Left Ear: External ear normal.   Nose: Nose normal.   Eyes: Conjunctivae and lids are normal. Right eye exhibits no discharge. Left eye exhibits no discharge. No scleral icterus.   Neck: Neck supple.   Pulmonary/Chest: Effort normal. No respiratory distress.   Musculoskeletal:        Left wrist: She exhibits decreased range of motion, tenderness and swelling ( trace). She exhibits no bony tenderness, no " effusion, no crepitus, no deformity and no laceration.   Diffuse mild effusion, radial sided TTP, difficult exam due to guarding, brisk cap refill, sensation intact to light touch   Neurological: She is alert and oriented to person, place, and time. She is not disoriented.   Skin: Skin is warm and dry. She is not diaphoretic. No erythema. No pallor.   Psychiatric: Her speech is normal and behavior is normal.   Nursing note and vitals reviewed.  dx wrist - Impression         No definite acute osseous abnormality but evaluation is limited due to osseous demineralization.   Reading Provider Reading Date   Robbie Catalan M.D. Jul 22, 2018   Signing Provider Signing Date Signing Time   Robbie Catalan M.D. Jul 22, 2018 11:19 AM       After x-ray. We attempt a second exam of left wrist. Patient was slightly increased active range of motion. Very mild tenderness to palpation over the scaphoid at anatomic snuffbox.      Assessment/Plan:   Assessment    1. Sprain of left wrist, initial encounter  - DX-WRIST-COMPLETE 3+ LEFT; Future  Recommend conservative care, rest, ice, elevation, work on gentle ROM exercises. OTC nsaids/tylenol. Wrist splint PRN. Return to normal use w/ rest and as pain as guide. Call for sports med referral if pain persists.     Return to clinic with lack of resolution or progression of symptoms.    Differential diagnosis, natural history, supportive care, and indications for immediate follow-up discussed.

## 2018-08-27 ENCOUNTER — OFFICE VISIT (OUTPATIENT)
Dept: MEDICAL GROUP | Facility: LAB | Age: 63
End: 2018-08-27
Payer: COMMERCIAL

## 2018-08-27 VITALS
SYSTOLIC BLOOD PRESSURE: 138 MMHG | WEIGHT: 194 LBS | RESPIRATION RATE: 14 BRPM | DIASTOLIC BLOOD PRESSURE: 90 MMHG | HEART RATE: 79 BPM | OXYGEN SATURATION: 93 % | TEMPERATURE: 97.8 F | HEIGHT: 66 IN | BODY MASS INDEX: 31.18 KG/M2

## 2018-08-27 DIAGNOSIS — M70.61 TROCHANTERIC BURSITIS OF RIGHT HIP: ICD-10-CM

## 2018-08-27 PROCEDURE — 99213 OFFICE O/P EST LOW 20 MIN: CPT | Performed by: INTERNAL MEDICINE

## 2018-08-27 RX ORDER — TRIAMCINOLONE ACETONIDE 40 MG/ML
40 INJECTION, SUSPENSION INTRA-ARTICULAR; INTRAMUSCULAR ONCE
OUTPATIENT
Start: 2018-08-27 | End: 2018-08-28

## 2018-08-27 RX ORDER — LIDOCAINE HYDROCHLORIDE 10 MG/ML
1 INJECTION, SOLUTION EPIDURAL; INFILTRATION; INTRACAUDAL; PERINEURAL ONCE
OUTPATIENT
Start: 2018-08-27 | End: 2018-08-28

## 2018-08-28 NOTE — PROGRESS NOTES
CC: Alisa Pena is a 62 y.o. female is suffering from   Chief Complaint   Patient presents with   • Leg Pain     right upper leg pain for months   • Elbow Pain     right elbow pain x 3 months         SUBJECTIVE:  1. Trochanteric bursitis of right hip  Patient is here for follow-up, has been suffering  from right trochanteric bursitis for multiple months.  X-ray reviewed.  - Triamcinolone Acetonide  - Lidocaine HCl (PF)        Past social, family, history: Single, boyfriend  Social History   Substance Use Topics   • Smoking status: Never Smoker   • Smokeless tobacco: Never Used   • Alcohol use Yes      Comment: 1 per week         MEDICATIONS:    Current Outpatient Prescriptions:   •  omeprazole (PRILOSEC) 20 MG delayed-release capsule, Take 20 mg by mouth 2 Times a Day., Disp: , Rfl:   •  metformin (GLUCOPHAGE) 500 MG Tab, Take 500 mg by mouth 2 times a day, with meals., Disp: , Rfl:   •  lisinopril (PRINIVIL) 20 MG Tab, Take 20 mg by mouth every day., Disp: , Rfl:   •  atorvastatin (LIPITOR) 10 MG Tab, Take 10 mg by mouth every evening., Disp: , Rfl:   •  aspirin EC (ECOTRIN) 81 MG Tablet Delayed Response, Take 81 mg by mouth every day., Disp: , Rfl:   •  tobramycin (TOBREX) 0.3 % Solution ophthalmic solution, Place 1 Drop in both eyes every 4 hours., Disp: 1 Bottle, Rfl: 0  •  clobetasol (TEMOVATE) 0.05 % Cream, , Disp: , Rfl:   •  fluticasone (FLONASE) 50 MCG/ACT nasal spray, Spray 2 Sprays in nose every day., Disp: 1 Bottle, Rfl: 0    Current Facility-Administered Medications:   •  triamcinolone acetonide (KENALOG-40) injection 40 mg, 40 mg, Intramuscular, Once, Trevor Lloyd D.O.  •  lidocaine PF (XYLOCAINE-MPF) 1 % injection 1 mL, 1 mL, Injection, Once, Trevor Lloyd D.O.    PROBLEMS:  Patient Active Problem List    Diagnosis Date Noted   • Eczema of both hands 04/24/2018   • Dyslipidemia 04/24/2018   • Broussard esophagus 04/24/2018   • Essential hypertension 04/24/2018   • Type 2 diabetes  "mellitus without complication ([facility]) 04/24/2018   • Preventative health care 04/24/2018       REVIEW OF SYSTEMS:  Gen.:  No Nausea, Vomiting, fever, Chills.  Heart: No chest pain.  Lungs:  No shortness of Breath.  Psychological: Jeremías unusual Anxiety depression     PHYSICAL EXAM   Constitutional: Alert, cooperative, not in acute distress.  Cardiovascular:  Rate Rhythm is regular without murmurs rubs clicks.     Thorax & Lungs: Clear to auscultation, no wheezing, rhonchi, or rales  HENT: Normocephalic, Atraumatic.  Eyes: PERRLA, EOMI, Conjunctiva normal.   Neck: Trachia is midline no swelling of the thyroid.   Musculoskeletal: Pain to palpation right trochanteric bursa  Neurologic: Alert & oriented x 3, cranial nerves II through XII are intact, Normal motor function, Normal sensory function, No focal deficits noted.   Psychiatric: Affect normal, Judgment normal, Mood normal.     VITAL SIGNS:/90   Pulse 79   Temp 36.6 °C (97.8 °F)   Resp 14   Ht 1.676 m (5' 6\")   Wt 88 kg (194 lb)   SpO2 93%   BMI 31.31 kg/m²     Labs: Reviewed    Assessment:                                                     Plan:    1. Trochanteric bursitis of right hip  Injected in the office  - triamcinolone acetonide (KENALOG-40) injection 40 mg; 1 mL by Intramuscular route Once.  - lidocaine PF (XYLOCAINE-MPF) 1 % injection 1 mL; 1 mL by Injection route Once.      Procedure note: Injection right trochanteric bursa.  Consents: Patient was consented regarding risks benefits including infection, poor outcome, neurological damage, all questions were answered.  Procedure: The area of maximal tenderness at the right trochanteric bursa was identified with a retracted ballpoint pen.  The area was then prepped with alcohol then Betadine swabs ×3.  Then infiltrated with 40 mg of Kenalog with 1 cc of 1% Xylocaine in a stellate pattern.  Patient condition: Patient tolerated the procedure well with approximately 50% reduction in her pain and " discomfort after 5 minutes.  Discharge instructions: Patient was instructed to notify a physician or be seen in emergency room setting should she notice any redness pain pus associated with the right injection site.

## 2018-09-12 RX ORDER — LISINOPRIL 20 MG/1
TABLET ORAL
Qty: 90 TAB | Refills: 1 | Status: SHIPPED | OUTPATIENT
Start: 2018-09-12 | End: 2019-03-28 | Stop reason: SDUPTHER

## 2018-09-17 ENCOUNTER — OFFICE VISIT (OUTPATIENT)
Dept: MEDICAL GROUP | Facility: LAB | Age: 63
End: 2018-09-17
Payer: COMMERCIAL

## 2018-09-17 VITALS
WEIGHT: 192 LBS | HEIGHT: 66 IN | DIASTOLIC BLOOD PRESSURE: 68 MMHG | HEART RATE: 74 BPM | RESPIRATION RATE: 12 BRPM | SYSTOLIC BLOOD PRESSURE: 108 MMHG | OXYGEN SATURATION: 94 % | BODY MASS INDEX: 30.86 KG/M2 | TEMPERATURE: 98.1 F

## 2018-09-17 DIAGNOSIS — E66.9 OBESITY (BMI 30-39.9): ICD-10-CM

## 2018-09-17 DIAGNOSIS — Z23 NEED FOR VACCINATION: ICD-10-CM

## 2018-09-17 DIAGNOSIS — M76.31 IT BAND SYNDROME, RIGHT: ICD-10-CM

## 2018-09-17 PROCEDURE — 90686 IIV4 VACC NO PRSV 0.5 ML IM: CPT | Performed by: NURSE PRACTITIONER

## 2018-09-17 PROCEDURE — 90471 IMMUNIZATION ADMIN: CPT | Performed by: NURSE PRACTITIONER

## 2018-09-17 PROCEDURE — 99213 OFFICE O/P EST LOW 20 MIN: CPT | Mod: 25 | Performed by: NURSE PRACTITIONER

## 2018-09-17 NOTE — LETTER
[facility]  Yanira Jules, A.P.N.  [address]  [place] [address]  Fax: [phone]   Authorization for Release/Disclosure of   Protected Health Information   Name: ALISA TREJO : 1955 SSN: [id number]   Address: [address] [place] [address] Phone:    [phone] (home)    I authorize the entity listed below to release/disclose the PHI below to:   [facility]/Yanira Jules, A.P.N. and Yanira Jules, A.P.N.   Provider or Entity Name:  Marcelo   Address   City, State, [facility]   Phone:      Fax:  Fax: [phone]   Reason for request: continuity of care   Information to be released:    [  ] LAST COLONOSCOPY,  including any PATH REPORT and follow-up  [  ] LAST FIT/COLOGUARD RESULT [  ] LAST DEXA  [  ] LAST MAMMOGRAM  [  ] LAST PAP  [  ] LAST LABS [  ] RETINA EXAM REPORT  [  ] IMMUNIZATION RECORDS  [  ] Release all info      [  ] Check here and initial the line next to each item to release ALL health information INCLUDING  _____ Care and treatment for drug and / or alcohol abuse  _____ HIV testing, infection status, or AIDS  _____ Genetic Testing    DATES OF SERVICE OR TIME PERIOD TO BE DISCLOSED: _____________  I understand and acknowledge that:  * This Authorization may be revoked at any time by you in writing, except if your health information has already been used or disclosed.  * Your health information that will be used or disclosed as a result of you signing this authorization could be re-disclosed by the recipient. If this occurs, your re-disclosed health information may no longer be protected by State or Federal laws.  * You may refuse to sign this Authorization. Your refusal will not affect your ability to obtain treatment.  * This Authorization becomes effective upon signing and will  on (date) __________.      If no date is indicated, this Authorization will  one (1) year from the signature date.    Name: Alisa Mireles  Jean    Signature:   Date:     9/17/2018       PLEASE FAX REQUESTED RECORDS BACK TO: [phone]

## 2018-09-17 NOTE — LETTER
[facility]  Yanira Jules, A.P.N.  [address]  [place] [address]  Fax: [phone]   Authorization for Release/Disclosure of   Protected Health Information   Name: ALISA TREJO : 1955 SSN: [id number]   Address: [address] [place] [address] Phone:    [phone] (home)    I authorize the entity listed below to release/disclose the PHI below to:   [facility]/Yanira Jules, A.P.N. and Yanira Jules, A.P.N.   Provider or Entity Name:  Dr Raf Mcleod   Address   City, [facility] Phone:      Fax:     Reason for request: continuity of care   Information to be released:    [  ] LAST COLONOSCOPY,  including any PATH REPORT and follow-up  [  ] LAST FIT/COLOGUARD RESULT [  ] LAST DEXA  [  ] LAST MAMMOGRAM  [  ] LAST PAP  [  ] LAST LABS [  ] RETINA EXAM REPORT  [  ] IMMUNIZATION RECORDS  [  ] Release all info      [  ] Check here and initial the line next to each item to release ALL health information INCLUDING  _____ Care and treatment for drug and / or alcohol abuse  _____ HIV testing, infection status, or AIDS  _____ Genetic Testing    DATES OF SERVICE OR TIME PERIOD TO BE DISCLOSED: _____________  I understand and acknowledge that:  * This Authorization may be revoked at any time by you in writing, except if your health information has already been used or disclosed.  * Your health information that will be used or disclosed as a result of you signing this authorization could be re-disclosed by the recipient. If this occurs, your re-disclosed health information may no longer be protected by State or Federal laws.  * You may refuse to sign this Authorization. Your refusal will not affect your ability to obtain treatment.  * This Authorization becomes effective upon signing and will  on (date) __________.      If no date is indicated, this Authorization will  one (1) year from the signature date.    Name: Alisa Mireles  Jean    Signature:   Date:     9/17/2018       PLEASE FAX REQUESTED RECORDS BACK TO: [phone]

## 2018-09-17 NOTE — PROGRESS NOTES
"Chief Complaint   Patient presents with   • Leg Pain     RIGHT/ hip to knee pain       GIOVANI Cuellar is a 62-year-old established female here to follow-up on persistent right-sided lateral leg pain, started last April.  She was seen here about a month after right leg pain started and had a negative x-ray of her femur that did show some spurring to her hip.  She returned here to see Dr. Lloyd and had her right hip injected which she states was not helpful at all.  She has tried naproxen without improvement.  She had no initial injuries or traumas.  She has a sedentary job.  Sitting is ok for her although she has slight discomfort, walking and lying down on sides hurt (right and left). Worse with putting a lot of weight on right hip, such as putting clothing on and standing only on right leg -often causes weakness.  Denies any other associated symptoms such as numbness or tingling to her right leg, low back pain, difficulties with bowels or bladder.  Denies any fevers, chills or unintentional weight loss.    Past medical, surgical, family, and social history is reviewed and updated in Epic chart by me today.   Medications and allergies reviewed and updated in Epic chart by me today.     ROS:   As documented in history of present illness above    Exam:  Blood pressure 108/68, pulse 74, temperature 36.7 °C (98.1 °F), resp. rate 12, height 1.676 m (5' 6\"), weight 87.1 kg (192 lb), SpO2 94 %.  Constitutional: Alert, no distress, plus 3 vital signs  Skin:  Warm, dry, no rashes invisible areas  Eye: Equal, round and reactive, conjunctiva clear  Respiratory: Unlabored respiration, lungs clear to auscultation, no wheezes, no rhonchi  Cardiovascular: Normal rate and rhythm  Musculoskeletal: She has a lot of tenderness along her right IT band from her lateral right knee up to her proximal/lateral right thigh.  She has no bony tenderness to her right knee or hip.  Psych: Alert, pleasant, well-groomed, normal affect    A/P:  1. " Need for vaccination  I have placed the below orders and discussed them with Dr. Pacheco. the MA is performing the below orders under the direction of Dr. PALOMARES    - INFLUENZA VACCINE QUAD INJ >3Y(PF)    2. It band syndrome, right  -Suspect that she has IT band syndrome and I encouraged her to see physical therapy for several weeks, then followed by orthopedics if she is not improving.  We discussed several stretches in the room.  Concern is regarding why this occurred as she is fairly physically inactive although she does tell me that she was doing more walking prior to this starting.  - REFERRAL TO PHYSICAL THERAPY Reason for Therapy: Eval/Treat/Report  - REFERRAL TO ORTHOPEDICS    3. Obesity (BMI 30-39.9)  - Patient identified as having weight management issue.  Appropriate orders and counseling given.

## 2018-09-20 ENCOUNTER — PHYSICAL THERAPY (OUTPATIENT)
Dept: PHYSICAL THERAPY | Facility: MEDICAL CENTER | Age: 63
End: 2018-09-20
Attending: NURSE PRACTITIONER
Payer: COMMERCIAL

## 2018-09-20 DIAGNOSIS — M76.31 IT BAND SYNDROME, RIGHT: ICD-10-CM

## 2018-09-20 PROCEDURE — 97162 PT EVAL MOD COMPLEX 30 MIN: CPT

## 2018-09-20 PROCEDURE — 97110 THERAPEUTIC EXERCISES: CPT

## 2018-09-20 ASSESSMENT — ENCOUNTER SYMPTOMS
PAIN SCALE AT LOWEST: 3
PAIN SCALE: 7
PAIN SCALE AT HIGHEST: 9

## 2018-09-20 ASSESSMENT — ACTIVITIES OF DAILY LIVING (ADL): POOR_BALANCE: 1

## 2018-09-20 NOTE — OP THERAPY EVALUATION
"  Outpatient Physical Therapy  INITIAL EVALUATION    [facility] Outpatient Physical Therapy  [address]  [place] [address]  Phone:  [phone]  Fax:  [phone]    Date of Evaluation: 2018    Patient: Alisa Pena  YOB: 1955  MRN: [id number]     Referring Provider: No referring provider defined for this encounter.   Referring Diagnosis No admission diagnoses are documented for this encounter.     Time Calculation  Start time: 1330  Stop time: 1410 Time Calculation (min): 40 minutes     Physical Therapy Occurrence Codes    Date of onset of impairment:  18   Date physical therapy care plan established or reviewed:  18   Date physical therapy treatment started:  18          Chief Complaint: Hip Problem    Visit Diagnoses     ICD-10-CM   1. It band syndrome, right M76.31         Subjective:   History of Present Illness:     Date of onset:  2018    Mechanism of injury:  Pt \"Alisa\" states in April of last year she noticed she had a lumpy spot in her hip and it was painful. She had been resting and taking ibuprofen but it has not really gotten better. She had an injection to the hip but that did not really seem to help much either. She went on vacation and did a lot of walking and stairs which really seemed to aggravate it. The pain is now spreading down her knee and wraps around her hip. It started just bothering her sleeping on her R but now it bothers her to sleep on either side. Pain is aggravated by standing on one leg, walking, WB, sleeping on the R. She just got some stretches from the doctor and they may be helping. 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 Pt denies n/t. Pt denies any issues with heart, history of cancer.   Pain:     Current pain ratin    At best pain rating:  3    At worst pain ratin  Patient Goals:     Patient goals for therapy:  " Decreased pain and increased strength    Other patient goals:  Sleep on right side      Past Medical History:   Diagnosis Date   • Cancer (HCC) 2009    squamous cell   • Diabetes (HCC)     Type 2   • Hypertension      Past Surgical History:   Procedure Laterality Date   • GANGLION EXCISION Right 12/23/2016    Procedure: GANGLION EXCISION - DORSAL WRIST;  Surgeon: Marquez Jauregui M.D.;  Location: SURGERY [facility];  Service:    • HAND SURGERY Right 2004    thumb and wrist   • HYSTERECTOMY, TOTAL ABDOMINAL  1992   • APPENDECTOMY  1990   • SHOULDER ARTHROSCOPY Right 1998/2002    rotator cuff repair     Social History   Substance Use Topics   • Smoking status: Never Smoker   • Smokeless tobacco: Never Used   • Alcohol use Yes      Comment: 1 per week     Family and Occupational History     Social History   • Marital status:      Spouse name: N/A   • Number of children: N/A   • Years of education: N/A       Objective     Lumbar Screen  Lumbar range of motion within normal limits with the following exceptions:: normal mobility and no tenderness with  to L1-5    Palpation   Left   No palpable tenderness to the gluteus medius, iliopsoas, piriformis and TFL.     Right   No palpable tenderness to the piriformis.   Tenderness of the gluteus medius, iliopsoas and TFL.     Additional Palpation Details  TTP along the entire R IT Band track    Tenderness     Left Hip   No tenderness in the ASIS, greater trochanter and iliac crest.     Right Hip   Tenderness in the ASIS. No tenderness in the greater trochanter and iliac crest.     Active Range of Motion   Left Hip   Flexion: WFL  External rotation (90/90): WFL  Internal rotation (90/90): WFL    Right Hip   Flexion: WFL  External rotation (90/90): WFL  Internal rotation (90/90): WFL    Strength:      Left Hip   Planes of Motion   Flexion: 4+  External rotation: 4+  Internal rotation: 4+    Right Hip   Planes of Motion   Flexion: 3+  External rotation:  "3+  Internal rotation: 3+    Additional Strength Details  Pain with all resistance to Hip MMT.  MMT: SL Hip abduction: R 3/5, L 4-/5  5/5 for B knee extension/flexion  5/5 for B ankle dorsiflexion/plantarflexion      Tests     Left Hip   Negative HARITHA, FADIR and scour.   Brennan: Negative.     Right Hip   Negative HARITHA, FADIR and scour.   Brennan: Positive.         Therapeutic Exercises (CPT 87418):     1. SKTC, reviewed - received from doctor    2. Leg cross over, reviewed - received from doctor    3. Piriformis (reg/mod), 30\" x 1 each    4. Brennan test, 30\" x 1    5. Bridge, 5\" x 10 x 1    6. Clams, 5\" x 10 x 1      Time-based treatments/modalities:  Therapeutic exercise minutes (CPT 52013): 10 minutes       Assessment, Response and Plan:   Impairments: abnormal coordination, abnormal gait, impaired balance, impaired physical strength and pain with function    Assessment details:  Pt is a pleasant, cooperative 63 yo female who presents with R hip and outer leg pain, s/s consistent with R IT Band syndome. Pt has weakness of her hips, tenderness along the IT Band, iliopsoas, and glut med, and pain with walking and WB activities. Pt will benefit from skilled physical therapy in order to strengthen her hips and core, decrease her tolerance of WB activities and walking, and decrease her overall pain in order to return to ADLs and recreational activities with less pain and restriction.  Goals:   Short Term Goals:   1. Pt is to be able to walk for 20 min without increase in pain  2. Pt is to be able to perform squat with proper form  3. Pt is to be able to have 0/10 VAS pain at rest  Short term goal time span:  2-4 weeks      Long Term Goals:    1. Pt is to be able to walk for 30 min without increase in pain.  2. Pt is to have 6/10 VAS pain at the highest  3. Pt is to have 4/5 MMT strength into B hip abduction  Long term goal time span:  6-8 weeks    Plan:   Therapy options:  Physical therapy treatment to " continue  Planned therapy interventions:  E Stim Unattended (CPT 95171), Gait Training (CPT 92427), Manual Therapy (CPT 79539), Mechanical Traction (CPT 46299), Neuromuscular Re-education (CPT 69585), Therapeutic Activities (CPT 50012) and Therapeutic Exercise (CPT 22798)  Frequency:  2x week  Duration in weeks:  8  Discussed with:  Patient  Plan details:  Pt to start with PT for 2x/week and will then decrease to 1x/week once HEP is established and pt is making improvements between visits.        Functional Limitation G-Codes and Severity Modifiers  Lower Extremity Functional Scale Total: 78.75   Current:  na   Goal: na     Referring provider co-signature:  I have reviewed this plan of care and my co-signature certifies the need for services.  Certification Dates:   From 09/20/18     To 11/15/18    Physician Signature: ________________________________ Date: ______________

## 2018-10-02 ENCOUNTER — PHYSICAL THERAPY (OUTPATIENT)
Dept: PHYSICAL THERAPY | Facility: MEDICAL CENTER | Age: 63
End: 2018-10-02
Attending: NURSE PRACTITIONER
Payer: COMMERCIAL

## 2018-10-02 DIAGNOSIS — M76.31 IT BAND SYNDROME, RIGHT: ICD-10-CM

## 2018-10-02 PROCEDURE — 97110 THERAPEUTIC EXERCISES: CPT

## 2018-10-02 PROCEDURE — 97140 MANUAL THERAPY 1/> REGIONS: CPT

## 2018-10-02 PROCEDURE — 97014 ELECTRIC STIMULATION THERAPY: CPT

## 2018-10-02 NOTE — OP THERAPY DAILY TREATMENT
"  Outpatient Physical Therapy  DAILY TREATMENT     [facility] Outpatient Physical Therapy  [address]  [place] [address]  Phone:  [phone]  Fax:  [phone]    Date: 10/02/2018    Patient: Alisa Pena  YOB: 1955  MRN: [id number]     Time Calculation  Start time: 0930  Stop time: 1015 Time Calculation (min): 45 minutes     Chief Complaint: Hip Pain (Right)    Visit #: 2    SUBJECTIVE:  Pt reports that she is feeling a little better following her initial evaluation, she continues to have the worst pain upon waking in the morning and with prolonged standing/walking. Pt reports that her pain shifts from her lateral quad to her posterior glute/lower back (only in the morning when dressing).    OBJECTIVE:  Current objective measures:     Pain in right lower back/SI with SLS on the right, improved slightly following standing lumbar extensions      Therapeutic Exercises (CPT 15033):     1. SKTC,  REVIEW    2. Piriformis stretch, REVIEW    3. Sciatic nerve glide, REVIEW    4. Bridges, REVIEW    5. Clamshells, REVIEW    6. Brennan Stretch, REVIEW    7. PPU, 10x10\" hold, \"feels great on my lower back\"    8. Prone LE extension, x10 bilaterally    9. Standing lumbar extensions, 5x10\" hold    10. SL stance with UE reaches laterally, 30\" bilaterally    11. BKFO stretch to right LE only, x2 minutes    Therapeutic Treatments and Modalities:     1. Manual Therapy (CPT 84693), IASTM/DTW to right glutes/lumbar spine/ITB, manual stretching to right hip across all planes    2. E Stim Unattended (CPT 96173), IFC+MHP to right lumbar spine/glutes, x15 minutes    Time-based treatments/modalities:  Manual therapy minutes (CPT 89534): 15 minutes  Therapeutic exercise minutes (CPT 77546): 15 minutes       Pain rating before treatment: 3  Pain rating after treatment: 2    ASSESSMENT:   Response to treatment: Pt demos significant TTP throughout right glutes/ITB during manual techniques; " may have lumbar referral contributing. Will reassess response to lumbar extensions next session.    PLAN/RECOMMENDATIONS:   Plan for treatment: therapy treatment to continue next visit.  Planned interventions for next visit: continue with current treatment.

## 2018-10-11 ENCOUNTER — APPOINTMENT (OUTPATIENT)
Dept: PHYSICAL THERAPY | Facility: MEDICAL CENTER | Age: 63
End: 2018-10-11
Attending: NURSE PRACTITIONER
Payer: COMMERCIAL

## 2018-10-16 ENCOUNTER — PHYSICAL THERAPY (OUTPATIENT)
Dept: PHYSICAL THERAPY | Facility: MEDICAL CENTER | Age: 63
End: 2018-10-16
Attending: NURSE PRACTITIONER
Payer: COMMERCIAL

## 2018-10-16 DIAGNOSIS — M76.31 IT BAND SYNDROME, RIGHT: ICD-10-CM

## 2018-10-16 PROCEDURE — 97110 THERAPEUTIC EXERCISES: CPT

## 2018-10-16 NOTE — OP THERAPY DAILY TREATMENT
"  Outpatient Physical Therapy  DAILY TREATMENT     [facility] Outpatient Physical Therapy  [address]  [place] [address]  Phone:  [phone]  Fax:  [phone]    Date: 10/16/2018    Patient: Alisa Pena  YOB: 1955  MRN: [id number]     Time Calculation  Start time: 1130  Stop time: 1225 Time Calculation (min): 55 minutes     Chief Complaint: No chief complaint on file.    Visit #: 3    SUBJECTIVE:  Pt states she is continuing to have 5-6/10 VAS pain in the mornings in her hip. The stretches help relieve it, but she wishes it would help with her overall pain. The back extensions felt really good from last week, she does have increased low back pain and is not sure what is causing that.    OBJECTIVE:  Current objective measures:      Pain in R LB and hip. Pain of R IT Band. Decreased R hip ER compared to L.      Therapeutic Exercises (CPT 89515):     1. Prone press up, 10\" x 10    2. Prone LE extension, 5\" x 10     3. SL hip abduction, 10 x 1    4. Prone frog stretch, 10\" x 5     5. Prone heel squeezes, 5\" x 10 x 1    6. SLS (head turns/EC), 30\" x 1 each      Therapeutic Exercise Summary: HEP: SKTC, piriformis stretch, sciatic nerve glide, clams, bridges, michael test, PPU, Prone LE extension, standing lumbar extension, SL hip abduction, prone heel squeeze, prone frog stretch, SLS (head turns/eyes closed)    Therapeutic Treatments and Modalities:     1. Manual Therapy (CPT 98156), prone fig 4 stretch, grade 3, 20\" x 3. neutral gap, grade 3, 20\" x 2. opening gap, grade 3, 20\" x 2. , Felt better than when she came in    2. E Stim Unattended (CPT 09330), IFC+MHP to lumbar spine/glutes, x15 minutes    Time-based treatments/modalities:  Manual therapy minutes (CPT 83171): 7 minutes  Therapeutic exercise minutes (CPT 72101): 25 minutes       Pain rating before treatment: 3  Pain rating after treatment: 2    ASSESSMENT:   Response to treatment: Pt demos significant " TTP throughout right glutes/ITB during manual techniques. Given pt's positive response to prone press ups and neutral/opening gap, it is likely she is experiencing referred pain from her lumbar spine in addition to IT Band pain. Pt has decreased pain following today's session. Pt encouraged to continue with HEP.    PLAN/RECOMMENDATIONS:   Plan for treatment: therapy treatment to continue next visit.  Planned interventions for next visit: continue with current treatment. Progress balance. Progress to quad exercises. Repeat manual therapy.

## 2018-10-23 ENCOUNTER — PHYSICAL THERAPY (OUTPATIENT)
Dept: PHYSICAL THERAPY | Facility: MEDICAL CENTER | Age: 63
End: 2018-10-23
Attending: NURSE PRACTITIONER
Payer: COMMERCIAL

## 2018-10-23 DIAGNOSIS — M76.31 IT BAND SYNDROME, RIGHT: ICD-10-CM

## 2018-10-23 PROCEDURE — 97140 MANUAL THERAPY 1/> REGIONS: CPT

## 2018-10-23 PROCEDURE — 97110 THERAPEUTIC EXERCISES: CPT

## 2018-10-23 NOTE — OP THERAPY DAILY TREATMENT
"  Outpatient Physical Therapy  DAILY TREATMENT     [facility] Outpatient Physical Therapy  [address]  [place] [address]  Phone:  [phone]  Fax:  [phone]    Date: 10/23/2018    Patient: Alisa Pena  YOB: 1955  MRN: [id number]     Time Calculation  Start time: 0930  Stop time: 1000 Time Calculation (min): 30 minutes     Chief Complaint: Hip Problem    Visit #: 4    SUBJECTIVE:  Pt states she is not really having morning pain anymore. She still has pain if she is in the grocery store and doing a lot of walking or standing.    OBJECTIVE:  Current objective measures:      Pain in R LB and hip. Pain of R IT Band. Decreased R hip ER compared to L.      Therapeutic Exercises (CPT 37065):     1. Squat, 10 x 1    2. 3-way hip, 10 x 1    3. Step up/down, 8\"/4\", 10 x 1    4. Tandem balance (head turns/EC/foam), 30\" x 1      Therapeutic Exercise Summary: HEP: SKTC, piriformis stretch, sciatic nerve glide, clams, bridges, michael test, PPU, Prone LE extension, standing lumbar extension, SL hip abduction, prone heel squeeze, prone frog stretch, SLS (head turns/eyes closed)    Therapeutic Treatments and Modalities:     1. Manual Therapy (CPT 33469), prone fig 4 stretch, grade 3, 20\" x 3. neutral gap, grade 3, 20\" x 2. opening gap, grade 3, 20\" x 2. , Felt better than when she came in    Time-based treatments/modalities:  Manual therapy minutes (CPT 11194): 10 minutes  Therapeutic exercise minutes (CPT 96555): 15 minutes       Pain rating before treatment: 3  Pain rating after treatment: 2    ASSESSMENT:   Response to treatment: Pt demos significant TTP throughout right glutes/ITB during manual techniques. Given pt's positive response to prone press ups and neutral/opening gap, it is likely she is experiencing referred pain from her lumbar spine in addition to IT Band pain. Pt had muscle soreness in her gluts following 3-way hip and SL standing but it did not provokeh " her symptoms indicating hip weakness is a contributor to her pain with prolonged standing and walking.    PLAN/RECOMMENDATIONS:   Plan for treatment: therapy treatment to continue next visit.  Planned interventions for next visit: continue with current treatment. Progress balance. Progress standing strength. Repeat manual therapy.

## 2018-10-29 ENCOUNTER — APPOINTMENT (OUTPATIENT)
Dept: MEDICAL GROUP | Facility: LAB | Age: 63
End: 2018-10-29
Payer: COMMERCIAL

## 2018-10-30 ENCOUNTER — PHYSICAL THERAPY (OUTPATIENT)
Dept: PHYSICAL THERAPY | Facility: MEDICAL CENTER | Age: 63
End: 2018-10-30
Attending: NURSE PRACTITIONER
Payer: COMMERCIAL

## 2018-10-30 DIAGNOSIS — M76.31 IT BAND SYNDROME, RIGHT: ICD-10-CM

## 2018-10-30 PROCEDURE — 97140 MANUAL THERAPY 1/> REGIONS: CPT

## 2018-10-30 PROCEDURE — 97110 THERAPEUTIC EXERCISES: CPT

## 2018-10-30 NOTE — OP THERAPY DAILY TREATMENT
"  Outpatient Physical Therapy  DAILY TREATMENT     [facility] Outpatient Physical Therapy  [address]  [place] [address]  Phone:  [phone]  Fax:  [phone]    Date: 10/30/2018    Patient: Alisa Pena  YOB: 1955  MRN: [id number]     Time Calculation  Start time: 1330  Stop time: 1355 Time Calculation (min): 25 minutes     Chief Complaint: Back Problem and Hip Problem    Visit #: 5    SUBJECTIVE:  Pt states she is doing pretty well. The side of her leg only had pain one time this last week when she was in the grocery store. She still has pain in her back in the mornings and in the hip in the mornings and if she stands after sitting for awhile.    OBJECTIVE:  Current objective measures:      Pain in R LB and hip. Pain of R IT Band. Decreased R hip ER compared to L.      Therapeutic Exercises (CPT 57113):     1. Squat, 10 x 1, discontinued    2. 3-way hip, 10 x 1, discontinued    3. Step up/down, 8\"/4\", 10 x 1, discontinued    4. Tandem balance (head turns/EC/foam), 30\" x 1, discontinued    5. Bridge, 10 x 1    6. Bridge w/ march, 10 x 2    7. Bridge with hip adduction, 5\" x 10 x 1    8. Prone leg extension with prone press pu, 10 x 3    9. Swimmers, 10 x 2    10. SL stance with SL raised on block, 10 x 1      Therapeutic Exercise Summary: HEP: SKTC, piriformis stretch, sciatic nerve glide, clams, bridges, michael test, PPU, Prone LE extension, standing lumbar extension, SL hip abduction, prone heel squeeze, prone frog stretch, SLS (head turns/eyes closed)    Therapeutic Treatments and Modalities:     1. Manual Therapy (CPT 68555), prone fig 4 stretch, grade 3, 20\" x 3. neutral gap, grade 3, 20\" x 2. opening gap, grade 3, 20\" x 2. , Felt better than when she came in    Time-based treatments/modalities:  Manual therapy minutes (CPT 56008): 10 minutes  Therapeutic exercise minutes (CPT 18850): 15 minutes       Pain rating before treatment: 3  Pain rating after " treatment: 2    ASSESSMENT:   Response to treatment: Pt demos significant TTP throughout right glutes/ITB during manual techniques. Given pt's positive response to prone press ups and neutral/opening gap, it is likely she is experiencing referred pain from her lumbar spine in addition to IT Band pain. Pt has increased pain in her R hip when she stands after sitting or sleeping. Pt encouraged to discontinue SL exercises as it may be provoking her pain and given SL stance with SL on block to start working towards more SL strength.    PLAN/RECOMMENDATIONS:   Plan for treatment: therapy treatment to continue next visit.  Planned interventions for next visit: continue with current treatment. Progress balance. Progress back to SL standing strength. Repeat manual therapy.

## 2018-11-06 ENCOUNTER — APPOINTMENT (OUTPATIENT)
Dept: PHYSICAL THERAPY | Facility: MEDICAL CENTER | Age: 63
End: 2018-11-06
Attending: NURSE PRACTITIONER
Payer: COMMERCIAL

## 2018-11-11 ENCOUNTER — TELEPHONE (OUTPATIENT)
Dept: MEDICAL GROUP | Facility: LAB | Age: 63
End: 2018-11-11

## 2018-11-11 NOTE — TELEPHONE ENCOUNTER
Phone Number Called: [phone] (home)     Message: I left a voicemail and sent a [employer] message regarding her n2u with Dr. Gould on 11/16/18. I let her know that Dr. Gould will be leaving in May and if she would like to establish care with Dr. Segura.    Left Message for patient to call back: yes

## 2018-11-13 ENCOUNTER — PHYSICAL THERAPY (OUTPATIENT)
Dept: PHYSICAL THERAPY | Facility: MEDICAL CENTER | Age: 63
End: 2018-11-13
Attending: NURSE PRACTITIONER
Payer: COMMERCIAL

## 2018-11-13 DIAGNOSIS — M76.31 IT BAND SYNDROME, RIGHT: ICD-10-CM

## 2018-11-13 PROCEDURE — 97110 THERAPEUTIC EXERCISES: CPT

## 2018-11-13 NOTE — OP THERAPY DAILY TREATMENT
"g  Outpatient Physical Therapy  DAILY TREATMENT     [facility] Outpatient Physical Therapy  [address]  [place] [address]  Phone:  [phone]  Fax:  [phone]    Date: 2018    Patient: Alisa Pena  YOB: 1955  MRN: [id number]     Time Calculation  Start time: 1205  Stop time: 1235 Time Calculation (min): 30 minutes     Chief Complaint: Back Problem and Hip Problem    Visit #: 6    SUBJECTIVE:  Pt states she is doing pretty well. She barely feels it in the morning, just with a lot of walking or climbing stairs.    OBJECTIVE:  Current objective measures:      Pain in R LB and hip. Pain of R IT Band. Decreased R hip ER compared to L.  Walkin min before pain  VAS: 5/10 worst, 0/10 best  Squat: cues to sit buttocks back       Therapeutic Exercises (CPT 21623):     1. Squat + Kegel, 10 x 1    2. 3-way hip, 10 x 1, discontinued    3. Step up/down, 8\"/4\", 10 x 1, discontinued    4. Tandem balance (head turns/EC/foam), 30\" x 1    5. Bridge + kegel, 10 x 1    6. Bridge / march, 10 x 1    7. Bridge with hip adduction, 5\" x 10 x 1    8. Prone leg extension with prone press pu, 10 x 3    9. Swimmers, 10 x 2    10. SL stance with SL raised on block, 10 x 1    11. Kegel, 5\" x 10 x 1    12. Wobble board (fwd/back, lateral), 10 x 1    13. Square wobble (fwd/back/circles/stance), 10 x 1    14. BOSU stance + Kegel, 5\" x 10 x 1    15. Bike, 3 min      Therapeutic Exercise Summary: HEP: SKTC, piriformis stretch, sciatic nerve glide, clams, bridges, michael test, PPU, Prone LE extension, standing lumbar extension, SL hip abduction, prone heel squeeze, prone frog stretch, SLS (head turns/eyes closed)      Time-based treatments/modalities:  Therapeutic exercise minutes (CPT 85686): 25 minutes       Pain rating before treatment: 3  Pain rating after treatment: 2    ASSESSMENT:   Response to treatment: Pt demos significant TTP throughout right glutes/ITB during manual " techniques. Given pt's positive response to prone press ups and neutral/opening gap, it is likely she is experiencing referred pain from her lumbar spine in addition to IT Band pain. Pt has increased pain in her R hip with SL activities such as climbing stairs or prolonged walking. Pt re-introduced to tandem balance and added kegel exercise for pelvic floor strengthening in addition to the other core exercises. Pt will slowly be progressed to step ups and downs for improved SL control and decreased pain with these activities.    PLAN/RECOMMENDATIONS:   Plan for treatment: therapy treatment to continue next visit.  Planned interventions for next visit: continue with current treatment. Progress balance. Progress back to SL standing strength. Ball against wall.

## 2018-11-13 NOTE — OP THERAPY PROGRESS SUMMARY
Outpatient Physical Therapy  PROGRESS SUMMARY NOTE      [facility] Outpatient Physical Therapy  [address]  [place] [address]  Phone:  [phone]  Fax:  [phone]    Date of Visit: 2018    Patient: Alisa Pena  YOB: 1955  MRN: [id number]     Referring Provider: ARLET Evans  [address]  [place] [address]   Referring Diagnosis Iliotibial band syndrome, right leg [M76.31]     Visit Diagnoses     ICD-10-CM   1. It band syndrome, right M76.31       Rehab Potential: good    Physical Therapy Occurrence Codes    Date of onset of impairment:  18   Date physical therapy care plan established or reviewed:  18   Date physical therapy treatment started:  18          Cert Period: 18 to 11/15/18. New Cert Period: 18 to 19  Progress Report Period: 18 to 18    Functional Limitation G-Codes and Severity Modifiers      Current status:     Goal status:           Objective Findings and Assessment:   Patient progression towards goals: Pt has decreased pain overall. Pt has no more pain in the mornings, but continues to have pain with SL activities like prolonged walking and stair climbing. Pt will beneift from progression to SL strengthening and movement pattern exercises to address this.    Objective findings and assessment details:   Pain in R LB and hip. Pain of R IT Band. Decreased R hip ER compared to L.  Walkin min before pain  VAS: 5/10 worst, 0/10 best  Squat: cues to sit buttocks back     Goals:   Short Term Goals:   Short Term Goals:   1. Pt is to be able to walk for 20 min without increase in pain. MET  2. Pt is to be able to perform squat with proper form. MET  3. Pt is to be able to have 0/10 VAS pain at rest. MET  Short term goal time span:  2-4 weeks      Short term goal time span:  2-4 weeks      Long Term Goals:    Long Term Goals:    1. Pt is to be able to walk for 30  min without increase in pain. PROGRESSING  2. Pt is to have 6/10 VAS pain at the highest. MET  3. Pt is to have 4/5 MMT strength into B hip abduction. NOT RE-MEASURED  Long term goal time span:  6-8 weeks  Long term goal time span:  6-8 weeks    Plan:   Planned therapy interventions:  E Stim Unattended (CPT 80676), Manual Therapy (CPT 67578), Neuromuscular Re-education (CPT 88013), Mechanical Traction (CPT 08533), Therapeutic Exercise (CPT 25780) and Therapeutic Activities (CPT 13612)  Frequency:  1x week  Duration in weeks:  8        Referring provider co-signature:  I have reviewed this plan of care and my co-signature certifies the need for services.    Physician Signature: ________________________________ Date: ______________

## 2018-11-16 ENCOUNTER — OFFICE VISIT (OUTPATIENT)
Dept: MEDICAL GROUP | Facility: LAB | Age: 63
End: 2018-11-16
Payer: COMMERCIAL

## 2018-11-16 VITALS
WEIGHT: 196.6 LBS | OXYGEN SATURATION: 93 % | HEART RATE: 78 BPM | DIASTOLIC BLOOD PRESSURE: 78 MMHG | BODY MASS INDEX: 31.6 KG/M2 | SYSTOLIC BLOOD PRESSURE: 122 MMHG | RESPIRATION RATE: 14 BRPM | HEIGHT: 66 IN | TEMPERATURE: 98.2 F

## 2018-11-16 DIAGNOSIS — E78.5 DYSLIPIDEMIA: ICD-10-CM

## 2018-11-16 DIAGNOSIS — K22.70 BARRETT'S ESOPHAGUS WITHOUT DYSPLASIA: ICD-10-CM

## 2018-11-16 DIAGNOSIS — Z00.00 ANNUAL PHYSICAL EXAM: ICD-10-CM

## 2018-11-16 DIAGNOSIS — L30.9 ECZEMA OF BOTH HANDS: ICD-10-CM

## 2018-11-16 DIAGNOSIS — E11.9 TYPE 2 DIABETES MELLITUS WITHOUT COMPLICATION, WITHOUT LONG-TERM CURRENT USE OF INSULIN (HCC): ICD-10-CM

## 2018-11-16 DIAGNOSIS — E55.9 VITAMIN D DEFICIENCY: ICD-10-CM

## 2018-11-16 DIAGNOSIS — Z00.00 PREVENTATIVE HEALTH CARE: ICD-10-CM

## 2018-11-16 DIAGNOSIS — E66.9 OBESITY (BMI 30-39.9): ICD-10-CM

## 2018-11-16 DIAGNOSIS — Z23 NEED FOR VACCINATION: ICD-10-CM

## 2018-11-16 DIAGNOSIS — I10 ESSENTIAL HYPERTENSION: ICD-10-CM

## 2018-11-16 PROCEDURE — 90471 IMMUNIZATION ADMIN: CPT | Performed by: INTERNAL MEDICINE

## 2018-11-16 PROCEDURE — 99214 OFFICE O/P EST MOD 30 MIN: CPT | Mod: 25 | Performed by: INTERNAL MEDICINE

## 2018-11-16 PROCEDURE — 90746 HEPB VACCINE 3 DOSE ADULT IM: CPT | Performed by: INTERNAL MEDICINE

## 2018-11-16 NOTE — PROGRESS NOTES
Chief Complaint   Patient presents with   • Diabetes       Subjective:     HPI:   Alisa presents today with the following.    Preventative health care  This is a pleasant 63-year-old lady who is here today to establish care with a new primary care provider.  We discussed about her preventive health exams as below:  Mammograms: March 2018.  PAP smears: S/P total hysterectomy. Per pt done in [place] 2010 and exempted by her GYN from further Paps.  Colon cancer screening: Colonoscopy done 2009. Repeat in 10 yrs.  Osteoporosis screening: DEXA done outside 2017, pt will bring records.  Immunizations: Current. May need hep B    Essential hypertension  New to me, chronic controlled.  Blood pressure has been well controlled on lisinopril 20 mg daily.  Patient has been compliant with this medication and takes it every day.  Her blood pressure at home and office are both within goal.  She denies side effects from this medication specifically no cough.    Obesity (BMI 30-39.9)  New to me, chronic controlled.  She recently  a new  who is very conscious about lifestyle.  She has been eating healthier and trying to exercise on a regular basis to lose weight.  So far, her weight has been a steady for the last 3 months.  Encouraged her to add more exercise activity and low-carb diet.    Broussard esophagus  New to me, chronic controlled.  Diagnosed 2009 and initially has EGD every 2 years and then switch to every 3 years.  She recently moved to [place] and establish with GI consultants.  She is scheduled for an EGD in January 2019.  She is asymptomatic at this time.  Continue to take omeprazole 20 mg daily.  Renal function is appropriate.  Denies any heartburn symptoms.    Eczema of both hands  New to me, chronic controlled.  Was worse when she was in [place] and used to use clobetasol 0.05% cream.  She has not been needing this since she moved to [place].    Type 2 diabetes mellitus without complication (HCC)  New to me, chronic  controlled.  Currently well controlled on metformin 500 mg twice daily.  Most recent A1c 6.1% in June 2018.  She is compliant with her medication but sometimes she forgets her evening dose.  Has been more conscious about her diet since her recent marriage.  -Retinopathy: Up-to-date on exams, will be obtaining her records from eye doctor  -Neuropathy: Denies symptoms, foot exam normal November 2019  -Nephropathy; normal microalbuminuria testing 2018, on ace inhibitors  -ASCVD risk: Currently on atorvastatin 10 mg and daily aspirin    Vitamin D deficiency  New, uncontrolled problem.  Her most recent vitamin D was 25 in June 2018.  Recommended starting over-the-counter 2000 IUs daily.      Patient Active Problem List    Diagnosis Date Noted   • Vitamin D deficiency 11/16/2018   • Obesity (BMI 30-39.9) 09/17/2018   • Eczema of both hands 04/24/2018   • Dyslipidemia 04/24/2018   • Broussard esophagus 04/24/2018   • Essential hypertension 04/24/2018   • Type 2 diabetes mellitus without complication (HCC) 04/24/2018   • Preventative health care 04/24/2018       Current Outpatient Prescriptions   Medication Sig Dispense Refill   • Omega-3 Fatty Acids (FISH OIL ADULT GUMMIES PO) Take  by mouth.     • lisinopril (PRINIVIL) 20 MG Tab TAKE 1 TAB BY MOUTH DAILY 90 Tab 1   • omeprazole (PRILOSEC) 20 MG delayed-release capsule Take 20 mg by mouth 2 Times a Day.     • metformin (GLUCOPHAGE) 500 MG Tab Take 500 mg by mouth 2 times a day, with meals.     • atorvastatin (LIPITOR) 10 MG Tab Take 10 mg by mouth every evening.     • aspirin EC (ECOTRIN) 81 MG Tablet Delayed Response Take 81 mg by mouth every day.       No current facility-administered medications for this visit.        Allergies as of 11/16/2018   • (No Known Allergies)        Past Medical History:   Diagnosis Date   • Cancer (HCC) 2009    squamous cell   • Diabetes (HCC)     Type 2   • Hypertension        Past Surgical History:   Procedure Laterality Date   • GANGLION  "EXCISION Right 12/23/2016    Procedure: GANGLION EXCISION - DORSAL WRIST;  Surgeon: Marquez Jauregui M.D.;  Location: SURGERY [facility];  Service:    • HAND SURGERY Right 2004    thumb and wrist   • HYSTERECTOMY, TOTAL ABDOMINAL  1992   • APPENDECTOMY  1990   • SHOULDER ARTHROSCOPY Right 1998/2002    rotator cuff repair       Social History   Substance Use Topics   • Smoking status: Never Smoker   • Smokeless tobacco: Never Used   • Alcohol use Yes      Comment: 1 per week       Family History   Problem Relation Age of Onset   • Diabetes Mother    • Cancer Mother         breast   • Lung Disease Father         COPD   • Diabetes Sister    • Heart Disease Neg Hx    • Stroke Neg Hx        ROS:     - Constitutional: Negative for fever, chills, unexpected weight change, and fatigue/generalized weakness.     - HEENT: Negative for headaches, vision changes, hearing changes, ear pain, ear discharge, sinus congestion, or sore throat.     - Respiratory: Negative for cough, sputum production, dyspnea and wheezing.    - Cardiovascular: Negative for chest pain or palpitations.      - Gastrointestinal: Negative for heartburn, nausea, vomiting, abdominal pain, diarrhea or constipation.     - Genitourinary: Negative for dysuria, polyuria or urinary urgency.    - Musculoskeletal: Negative for myalgias, back pain, and joint pain.     - Skin: Negative for rash, itching, cyanotic skin color change.     - Psychiatric/Behavioral: Negative for depression or suicidal/homicidal ideation.       Physical Exam:     Blood pressure 122/78, pulse 78, temperature 36.8 °C (98.2 °F), temperature source Temporal, resp. rate 14, height 1.676 m (5' 6\"), weight 89.2 kg (196 lb 9.6 oz), SpO2 93 %. Body mass index is 31.73 kg/m².   Gen:         Alert and oriented, No apparent distress.  Neck:        No Lymphadenopathy or Bruits.  Lungs:     Clear to auscultation bilaterally  CV:          Regular rate and rhythm. No murmurs, rubs or gallops.       " Ext:          No clubbing, cyanosis, edema.   Diabetic Foot Exam: No ulcers/laceration/blisters present on bilateral feet, normal gross anatomy of bilateral feet without abnormal curvature or arch, no toe deformities, no toenail thickness, no ingrown toenails, no increase in skin temperature bilaterally, no skin erythema to bilateral feet, bilateral dorsalis pedis and posterior tibial pulses 2+ and equal,Morelia 10 g monofilament testing normal in bilateral great toes, bilateral balls of feet at medial/lateral/mid ball of foot    Data:     LABS: 6/2018: Results reviewed and discussed with the patient, questions answered.      Assessment and Plan:     63 y.o. female with the following issues.    1. Preventative health care  As discussed in HPI, up-to-date on health maintenance.  Will need hepatitis B immunization.    3. Eczema of both hands  New to me, chronic controlled.  Improved after moving to [place].  May use topical clobetasol cream.    4. Dyslipidemia  New to me, chronic controlled.  Lipid panel is at goal, continue on atorvastatin 10 mg daily and fish oil.  - Lipid Profile; Future    5. Broussard's esophagus without dysplasia  New to me, chronic controlled.  Scheduled for EGD January 2019.  Continue on omeprazole 20 mg daily.    6. Type 2 diabetes mellitus without complication, without long-term current use of insulin (HCC)  New to me, chronic controlled.  Continue metformin 500 mg twice daily.  Health maintenance up-to-date as discussed in HPI.  Will obtain her eye records.   I will be refilling her metformin 500 mg twice daily.    Repeat A1c in [facility] before next visit.  - HEMOGLOBIN A1C; Future  - COMP METABOLIC PANEL; Future  - Diabetic Monofilament Lower Extremity Exam    7. Need for vaccination  Started hep B series shot today.  - Hepatitis B Vaccine Adult IM    8. Vitamin D deficiency  New to me, chronic controlled.  Started over-the-counter vitamin D supplements with 2000 IUs daily.  - VITAMIN D,25 HYDROXY;  Future    9. Essential hypertension  New to me, chronic controlled.  We will refill her lisinopril 20 mg daily.    10. Obesity (BMI 30-39.9)  New to me, chronic controlled.  Discussed various lifestyle modifications to help lose weight.           Health Maintenance:   Completed.    Health Maintenance Due   Topic   • RETINAL SCREENING    • IMM HEP B VACCINE (1 of 3 - Risk 3-dose series)   • IMM DTaP/Tdap/Td Vaccine (1 - Tdap)   • IMM PNEUMOCOCCAL 19-64 (ADULT) MEDIUM RISK SERIES (1 of 1 - PPSV23)   • IMM ZOSTER VACCINES (1 of 2)       Follow Up:      Return in about 5 months (around 4/16/2019) for [facility] Labs/DM/Re-est.      Please note that this dictation was created using voice recognition software. I have made every reasonable attempt to correct obvious errors, but I expect that there are errors of grammar and possibly content that I did not discover before finalizing the note.    Signed by: Stephanie Gould M.D.

## 2018-11-16 NOTE — ASSESSMENT & PLAN NOTE
New to me, chronic controlled.  Diagnosed 2009 and initially has EGD every 2 years and then switch to every 3 years.  She recently moved to [place] and establish with GI consultants.  She is scheduled for an EGD in January 2019.  She is asymptomatic at this time.  Continue to take omeprazole 20 mg daily.  Renal function is appropriate.  Denies any heartburn symptoms.

## 2018-11-16 NOTE — ASSESSMENT & PLAN NOTE
New to me, chronic controlled.  Blood pressure has been well controlled on lisinopril 20 mg daily.  Patient has been compliant with this medication and takes it every day.  Her blood pressure at home and office are both within goal.  She denies side effects from this medication specifically no cough.

## 2018-11-16 NOTE — ASSESSMENT & PLAN NOTE
New, uncontrolled problem.  Her most recent vitamin D was 25 in June 2018.  Recommended starting over-the-counter 2000 IUs daily.

## 2018-11-16 NOTE — ASSESSMENT & PLAN NOTE
This is a pleasant 63-year-old lady who is here today to establish care with a new primary care provider.  We discussed about her preventive health exams as below:  Mammograms: March 2018.  PAP smears: S/P total hysterectomy. Per pt done in [place] 2010 and exempted by her GYN from further Paps.  Colon cancer screening: Colonoscopy done 2009. Repeat in 10 yrs.  Osteoporosis screening: DEXA done outside 2017, pt will bring records.  Immunizations: Current. May need hep B

## 2018-11-16 NOTE — ASSESSMENT & PLAN NOTE
New to me, chronic controlled.  Currently well controlled on metformin 500 mg twice daily.  Most recent A1c 6.1% in June 2018.  She is compliant with her medication but sometimes she forgets her evening dose.  Has been more conscious about her diet since her recent marriage.  -Retinopathy: Up-to-date on exams, will be obtaining her records from eye doctor  -Neuropathy: Denies symptoms, foot exam normal November 2019  -Nephropathy; normal microalbuminuria testing 2018, on ace inhibitors  -ASCVD risk: Currently on atorvastatin 10 mg and daily aspirin

## 2018-11-16 NOTE — ASSESSMENT & PLAN NOTE
New to me, chronic controlled.  Was worse when she was in [place] and used to use clobetasol 0.05% cream.  She has not been needing this since she moved to [place].

## 2018-11-16 NOTE — ASSESSMENT & PLAN NOTE
New to me, chronic controlled.  She recently  a new  who is very conscious about lifestyle.  She has been eating healthier and trying to exercise on a regular basis to lose weight.  So far, her weight has been a steady for the last 3 months.  Encouraged her to add more exercise activity and low-carb diet.

## 2018-11-16 NOTE — LETTER
[facility]  Stephanie Gould M.D.  [address]  [place] [address]  Fax: [phone]   Authorization for Release/Disclosure of   Protected Health Information   Name: RHONDA PENA : 1955 SSN: [id number]   Address: [address] [place] [address] Phone:    [phone] (home)    I authorize the entity listed below to release/disclose the PHI below to:   [facility]/Stephanie Gould M.D. and Stephanie Gould M.D.   Provider or Entity Name:     Address   City, State, Zip   Phone:      Fax:     Reason for request: continuity of care   Information to be released:    [  ] LAST COLONOSCOPY,  including any PATH REPORT and follow-up  [  ] LAST FIT/COLOGUARD RESULT [  ] LAST DEXA  [  ] LAST MAMMOGRAM  [  ] LAST PAP  [  ] LAST LABS [  ] RETINA EXAM REPORT  [  ] IMMUNIZATION RECORDS  [  ] Release all info      [  ] Check here and initial the line next to each item to release ALL health information INCLUDING  _____ Care and treatment for drug and / or alcohol abuse  _____ HIV testing, infection status, or AIDS  _____ Genetic Testing    DATES OF SERVICE OR TIME PERIOD TO BE DISCLOSED: _____________  I understand and acknowledge that:  * This Authorization may be revoked at any time by you in writing, except if your health information has already been used or disclosed.  * Your health information that will be used or disclosed as a result of you signing this authorization could be re-disclosed by the recipient. If this occurs, your re-disclosed health information may no longer be protected by State or Federal laws.  * You may refuse to sign this Authorization. Your refusal will not affect your ability to obtain treatment.  * This Authorization becomes effective upon signing and will  on (date) __________.      If no date is indicated, this Authorization will  one (1) year from the signature date.    Name: Rhonda Pena    Signature:   Date:     2018       PLEASE FAX REQUESTED  RECORDS BACK TO: [phone]

## 2018-12-14 ENCOUNTER — TELEPHONE (OUTPATIENT)
Dept: MEDICAL GROUP | Facility: LAB | Age: 63
End: 2018-12-14

## 2018-12-14 DIAGNOSIS — Z23 NEED FOR HEPATITIS B VACCINATION: Primary | ICD-10-CM

## 2018-12-14 NOTE — TELEPHONE ENCOUNTER
1. Caller Name: Alisa Watson is on the MA Schedule 12/17/18 for Hep B vaccine/injection.    SPECIFIC Action To Be Taken: Orders pending, please sign.

## 2018-12-17 ENCOUNTER — NON-PROVIDER VISIT (OUTPATIENT)
Dept: MEDICAL GROUP | Facility: LAB | Age: 63
End: 2018-12-17
Payer: COMMERCIAL

## 2018-12-17 DIAGNOSIS — Z23 NEED FOR HEPATITIS B VACCINATION: ICD-10-CM

## 2018-12-17 PROCEDURE — 90746 HEPB VACCINE 3 DOSE ADULT IM: CPT | Performed by: INTERNAL MEDICINE

## 2018-12-17 PROCEDURE — 90471 IMMUNIZATION ADMIN: CPT | Performed by: INTERNAL MEDICINE

## 2018-12-17 NOTE — PROGRESS NOTES
"Alisa Pena is a 63 y.o. female here for a non-provider visit for:   HEPATITIS B 2 of 3    Reason for immunization: continue or complete series started at the office  Immunization records indicate need for vaccine: Yes, confirmed with [place] [employer]  Minimum interval has been met for this vaccine: Yes  ABN completed: Not Indicated    Order and dose verified by: AGNES  VIS Dated  10/12/2018 was given to patient: Yes  All IAC Questionnaire questions were answered \"No.\"    Patient tolerated injection and no adverse effects were observed or reported: Yes    Pt scheduled for next dose in series: Yes    "

## 2019-01-18 ENCOUNTER — TELEPHONE (OUTPATIENT)
Dept: PHYSICAL THERAPY | Facility: REHABILITATION | Age: 64
End: 2019-01-18

## 2019-01-18 NOTE — OP THERAPY DISCHARGE SUMMARY
Outpatient Physical Therapy  DISCHARGE SUMMARY NOTE      [address]  [address]  [place] [address]  Phone:  [phone]  Fax:  [phone]    Date of Visit: 01/18/2019    Patient: Alisa Pena  YOB: 1955  MRN: [id number]     Referring Provider: ARLET Ramos   Referring Diagnosis M76.31 (ICD-10-CM) - It band syndrome, right     Physical Therapy Occurrence Codes    Date of onset of impairment:  9/17/18   Date physical therapy care plan established or reviewed:  9/20/18   Date physical therapy treatment started:  9/20/18          Functional Limitations and Severity Modifiers      Goal:     Discharge:         Your patient is being discharged from Physical Therapy with the following comments:   · Patient has failed to schedule or reschedule follow-up visits    Comments:  Pt did not reschedule and has been >30 days since last appointment     Limitations Remaining:  Unable to reassess    Recommendations:  Pt to return to physician for new referral if she wishes to return to PT.    Aretha Johnson, PT, DPT    Date: 1/18/2019

## 2019-01-23 RX ORDER — ATORVASTATIN CALCIUM 10 MG/1
10 TABLET, FILM COATED ORAL DAILY
Qty: 30 TAB | Refills: 10 | Status: SHIPPED | OUTPATIENT
Start: 2019-01-23 | End: 2019-11-14 | Stop reason: SDUPTHER

## 2019-01-23 RX ORDER — OMEPRAZOLE 20 MG/1
20 CAPSULE, DELAYED RELEASE ORAL 2 TIMES DAILY
Qty: 30 CAP | Refills: 10 | Status: SHIPPED | OUTPATIENT
Start: 2019-01-23

## 2019-01-23 NOTE — TELEPHONE ENCOUNTER
Was the patient seen in the last year in this department? Yes lov 11/16/18    Does patient have an active prescription for medications requested? No     Received Request Via: Patient request

## 2019-02-08 ENCOUNTER — HOSPITAL ENCOUNTER (OUTPATIENT)
Dept: LAB | Facility: MEDICAL CENTER | Age: 64
End: 2019-02-08
Attending: INTERNAL MEDICINE
Payer: COMMERCIAL

## 2019-02-08 DIAGNOSIS — E55.9 VITAMIN D DEFICIENCY: ICD-10-CM

## 2019-02-08 DIAGNOSIS — E11.9 TYPE 2 DIABETES MELLITUS WITHOUT COMPLICATION, WITHOUT LONG-TERM CURRENT USE OF INSULIN (HCC): ICD-10-CM

## 2019-02-08 DIAGNOSIS — E78.5 DYSLIPIDEMIA: ICD-10-CM

## 2019-02-08 LAB
25(OH)D3 SERPL-MCNC: 28 NG/ML (ref 30–100)
ALBUMIN SERPL BCP-MCNC: 4.6 G/DL (ref 3.2–4.9)
ALBUMIN/GLOB SERPL: 1.4 G/DL
ALP SERPL-CCNC: 82 U/L (ref 30–99)
ALT SERPL-CCNC: 23 U/L (ref 2–50)
ANION GAP SERPL CALC-SCNC: 9 MMOL/L (ref 0–11.9)
AST SERPL-CCNC: 19 U/L (ref 12–45)
BILIRUB SERPL-MCNC: 0.5 MG/DL (ref 0.1–1.5)
BUN SERPL-MCNC: 18 MG/DL (ref 8–22)
CALCIUM SERPL-MCNC: 9.8 MG/DL (ref 8.5–10.5)
CHLORIDE SERPL-SCNC: 105 MMOL/L (ref 96–112)
CHOLEST SERPL-MCNC: 206 MG/DL (ref 100–199)
CO2 SERPL-SCNC: 27 MMOL/L (ref 20–33)
CREAT SERPL-MCNC: 0.73 MG/DL (ref 0.5–1.4)
EST. AVERAGE GLUCOSE BLD GHB EST-MCNC: 137 MG/DL
FASTING STATUS PATIENT QL REPORTED: NORMAL
GLOBULIN SER CALC-MCNC: 3.3 G/DL (ref 1.9–3.5)
GLUCOSE SERPL-MCNC: 108 MG/DL (ref 65–99)
HBA1C MFR BLD: 6.4 % (ref 0–5.6)
HDLC SERPL-MCNC: 68 MG/DL
LDLC SERPL CALC-MCNC: 119 MG/DL
POTASSIUM SERPL-SCNC: 4.6 MMOL/L (ref 3.6–5.5)
PROT SERPL-MCNC: 7.9 G/DL (ref 6–8.2)
SODIUM SERPL-SCNC: 141 MMOL/L (ref 135–145)
TRIGL SERPL-MCNC: 94 MG/DL (ref 0–149)

## 2019-02-08 PROCEDURE — 82306 VITAMIN D 25 HYDROXY: CPT

## 2019-02-08 PROCEDURE — 80061 LIPID PANEL: CPT

## 2019-02-08 PROCEDURE — 80053 COMPREHEN METABOLIC PANEL: CPT

## 2019-02-08 PROCEDURE — 36415 COLL VENOUS BLD VENIPUNCTURE: CPT

## 2019-02-08 PROCEDURE — 83036 HEMOGLOBIN GLYCOSYLATED A1C: CPT

## 2019-02-14 ENCOUNTER — HOSPITAL ENCOUNTER (OUTPATIENT)
Dept: RADIOLOGY | Facility: MEDICAL CENTER | Age: 64
End: 2019-02-14
Attending: ORTHOPAEDIC SURGERY
Payer: COMMERCIAL

## 2019-02-14 DIAGNOSIS — R22.41 LOWER LEG MASS, RIGHT: ICD-10-CM

## 2019-02-14 PROCEDURE — 700117 HCHG RX CONTRAST REV CODE 255: Performed by: ORTHOPAEDIC SURGERY

## 2019-02-14 PROCEDURE — A9585 GADOBUTROL INJECTION: HCPCS | Performed by: ORTHOPAEDIC SURGERY

## 2019-02-14 PROCEDURE — 73720 MRI LWR EXTREMITY W/O&W/DYE: CPT | Mod: RT

## 2019-02-14 RX ORDER — GADOBUTROL 604.72 MG/ML
10 INJECTION INTRAVENOUS ONCE
Status: COMPLETED | OUTPATIENT
Start: 2019-02-14 | End: 2019-02-14

## 2019-02-14 RX ADMIN — GADOBUTROL 10 ML: 604.72 INJECTION INTRAVENOUS at 17:39

## 2019-04-23 ENCOUNTER — APPOINTMENT (RX ONLY)
Dept: URBAN - METROPOLITAN AREA CLINIC 35 | Facility: CLINIC | Age: 64
Setting detail: DERMATOLOGY
End: 2019-04-23

## 2019-04-23 DIAGNOSIS — D22 MELANOCYTIC NEVI: ICD-10-CM

## 2019-04-23 DIAGNOSIS — L57.0 ACTINIC KERATOSIS: ICD-10-CM

## 2019-04-23 DIAGNOSIS — L82.1 OTHER SEBORRHEIC KERATOSIS: ICD-10-CM

## 2019-04-23 DIAGNOSIS — L81.4 OTHER MELANIN HYPERPIGMENTATION: ICD-10-CM

## 2019-04-23 DIAGNOSIS — Z71.89 OTHER SPECIFIED COUNSELING: ICD-10-CM

## 2019-04-23 PROBLEM — D48.5 NEOPLASM OF UNCERTAIN BEHAVIOR OF SKIN: Status: ACTIVE | Noted: 2019-04-23

## 2019-04-23 PROBLEM — D22.4 MELANOCYTIC NEVI OF SCALP AND NECK: Status: ACTIVE | Noted: 2019-04-23

## 2019-04-23 PROCEDURE — ? ADDITIONAL NOTES

## 2019-04-23 PROCEDURE — ? DEFER

## 2019-04-23 PROCEDURE — 99213 OFFICE O/P EST LOW 20 MIN: CPT

## 2019-04-23 PROCEDURE — ? OBSERVATION AND MEASURE

## 2019-04-23 PROCEDURE — ? COUNSELING

## 2019-04-23 ASSESSMENT — LOCATION ZONE DERM
LOCATION ZONE: NECK
LOCATION ZONE: ARM
LOCATION ZONE: FACE

## 2019-04-23 ASSESSMENT — LOCATION DETAILED DESCRIPTION DERM
LOCATION DETAILED: RIGHT POSTERIOR SHOULDER
LOCATION DETAILED: LEFT FOREHEAD
LOCATION DETAILED: RIGHT LATERAL TRAPEZIAL NECK

## 2019-04-23 ASSESSMENT — LOCATION SIMPLE DESCRIPTION DERM
LOCATION SIMPLE: RIGHT SHOULDER
LOCATION SIMPLE: POSTERIOR NECK
LOCATION SIMPLE: LEFT FOREHEAD

## 2019-04-23 NOTE — PROCEDURE: DEFER
Detail Level: Simple
Introduction Text (Please End With A Colon): The following procedure was deferred:
Detail Level: Detailed

## 2019-05-09 ENCOUNTER — OFFICE VISIT (OUTPATIENT)
Dept: MEDICAL GROUP | Facility: LAB | Age: 64
End: 2019-05-09
Payer: COMMERCIAL

## 2019-05-09 VITALS
TEMPERATURE: 97.6 F | RESPIRATION RATE: 14 BRPM | BODY MASS INDEX: 32.24 KG/M2 | DIASTOLIC BLOOD PRESSURE: 76 MMHG | OXYGEN SATURATION: 94 % | WEIGHT: 200.6 LBS | HEART RATE: 88 BPM | HEIGHT: 66 IN | SYSTOLIC BLOOD PRESSURE: 120 MMHG

## 2019-05-09 DIAGNOSIS — E11.9 TYPE 2 DIABETES MELLITUS WITHOUT COMPLICATION, WITHOUT LONG-TERM CURRENT USE OF INSULIN (HCC): ICD-10-CM

## 2019-05-09 DIAGNOSIS — E55.9 VITAMIN D DEFICIENCY: ICD-10-CM

## 2019-05-09 DIAGNOSIS — K22.70 BARRETT'S ESOPHAGUS WITHOUT DYSPLASIA: ICD-10-CM

## 2019-05-09 DIAGNOSIS — Z23 NEED FOR VACCINATION: ICD-10-CM

## 2019-05-09 DIAGNOSIS — E66.9 OBESITY (BMI 30-39.9): ICD-10-CM

## 2019-05-09 DIAGNOSIS — E78.5 DYSLIPIDEMIA: ICD-10-CM

## 2019-05-09 DIAGNOSIS — Z12.39 ENCOUNTER FOR SCREENING FOR MALIGNANT NEOPLASM OF BREAST: ICD-10-CM

## 2019-05-09 DIAGNOSIS — I10 ESSENTIAL HYPERTENSION: ICD-10-CM

## 2019-05-09 LAB
HBA1C MFR BLD: 6 % (ref 0–5.6)
INT CON NEG: NEGATIVE
INT CON POS: POSITIVE

## 2019-05-09 PROCEDURE — 99214 OFFICE O/P EST MOD 30 MIN: CPT | Mod: 25 | Performed by: NURSE PRACTITIONER

## 2019-05-09 PROCEDURE — 83036 HEMOGLOBIN GLYCOSYLATED A1C: CPT | Performed by: NURSE PRACTITIONER

## 2019-05-09 PROCEDURE — 90746 HEPB VACCINE 3 DOSE ADULT IM: CPT | Performed by: NURSE PRACTITIONER

## 2019-05-09 PROCEDURE — 90471 IMMUNIZATION ADMIN: CPT | Performed by: NURSE PRACTITIONER

## 2019-05-09 ASSESSMENT — PATIENT HEALTH QUESTIONNAIRE - PHQ9: CLINICAL INTERPRETATION OF PHQ2 SCORE: 0

## 2019-05-09 NOTE — ASSESSMENT & PLAN NOTE
This is a chronic problem for which she was diagnosed in 2009.  She is currently followed by Dr Bello a GI consult.  Currently taking omeprazole 20 mg daily.  She had an EGD in January which was stable and she was recommended to have repeat every 3 years.

## 2019-05-09 NOTE — ASSESSMENT & PLAN NOTE
New to me, uncontrolled chronic problem.  Last vitamin D level in February, 2019 was 27.  She states that she does regularly take an over-the-counter vitamin D supplement however she is unsure of the dosage.  Recommended increasing dose if not compliant daily and recheck vitamin D level in 6 months.

## 2019-05-09 NOTE — ASSESSMENT & PLAN NOTE
New to me, chronic well-controlled problem.  Patient's taking lisinopril 20 mg daily.  She reports taking it daily and her blood pressures have been well controlled.  She denies chest pain, shortness of breath, headaches, lightheadedness or lower extremity edema.

## 2019-05-09 NOTE — PROGRESS NOTES
CC:Diagnoses of Dyslipidemia, Essential hypertension, Type 2 diabetes mellitus without complication, without long-term current use of insulin (HCC), Need for vaccination, Encounter for screening for malignant neoplasm of breast, Broussard's esophagus without dysplasia, Vitamin D deficiency, and Obesity (BMI 30-39.9) were pertinent to this visit.    HISTORY OF PRESENT ILLNESS: Alisa Vasquez is a 63 y.o. female established patient who presents today to discuss the following problems:    Type 2 diabetes mellitus without complication (HCC)  New to me, well controlled on metformin.  She currently takes 500 mg twice daily however she reports that some days she only takes 500 mg.  I have advised doubling her dose in the morning if she is not going to take split doses.  She denies any side effects from medication.  Her A1c is down today for to 6.0 from 6.4 in February.  Her last eye exam was in August 2018 per patient no signs of retinopathy.  She denies any numbness, tingling in her extremities.  For her ASCVD risk she is on atorvastatin and aspirin 81 mg.  I have provided her with lab slips for her next lab draw prior to her visit in 6 months.    Broussard esophagus  This is a chronic problem for which she was diagnosed in 2009.  She is currently followed by Dr Bello a GI consult.  Currently taking omeprazole 20 mg daily.  She had an EGD in January which was stable and she was recommended to have repeat every 3 years.        Dyslipidemia  This is a new to me, chronic problem.  ASCVD score is 7.4%.  Patient is currently taking atorvastatin 10 mg daily.  Reports that her diet has been less than optimal.  Last labs in February shown below.    Patient and I then discussed necessary dietary changes to make to address dyslipidemia. She denies dizziness, generalized weakness/fatigue, vision/hearing changes, jaw pain/paresthesias, BUE pain/paresthesias/numbness/weakness, chest pain/pressure, palpitations, dyspnea, RUQ abdominal  pain, oliguria/anuria, BLE edema.    Lab Results   Component Value Date/Time    CHOLSTRLTOT 206 (H) 02/08/2019 08:06 AM     (H) 02/08/2019 08:06 AM    HDL 68 02/08/2019 08:06 AM    TRIGLYCERIDE 94 02/08/2019 08:06 AM         Essential hypertension  New to me, chronic well-controlled problem.  Patient's taking lisinopril 20 mg daily.  She reports taking it daily and her blood pressures have been well controlled.  She denies chest pain, shortness of breath, headaches, lightheadedness or lower extremity edema.      Vitamin D deficiency  New to me, uncontrolled chronic problem.  Last vitamin D level in February, 2019 was 27.  She states that she does regularly take an over-the-counter vitamin D supplement however she is unsure of the dosage.  Recommended increasing dose if not compliant daily and recheck vitamin D level in 6 months.    Obesity (BMI 30-39.9)  Patient and I discussed the importance of lifestyle changes, with particular emphasis on decreasing sugar and carbohydrate intake and increasing plant-based nutrition (for the purposes of weight loss, general health, and prevention of chronic illnesses), as well as regular cardiovascular exercise, proper sleep, and stress management. Patient verbalized understanding.        Health Maintenance: Completed    Patient Active Problem List    Diagnosis Date Noted   • Vitamin D deficiency 11/16/2018   • Obesity (BMI 30-39.9) 09/17/2018   • Eczema of both hands 04/24/2018   • Dyslipidemia 04/24/2018   • Broussard esophagus 04/24/2018   • Essential hypertension 04/24/2018   • Type 2 diabetes mellitus without complication (HCC) 04/24/2018   • Preventative health care 04/24/2018        Allergies:Patient has no known allergies.    Current Outpatient Prescriptions   Medication Sig Dispense Refill   • lisinopril (PRINIVIL) 20 MG Tab TAKE 1 TABLET BY MOUTH EVERY DAY 90 Tab 0   • omeprazole (PRILOSEC) 20 MG delayed-release capsule Take 1 Cap by mouth 2 Times a Day. 30 Cap 10    • atorvastatin (LIPITOR) 10 MG Tab Take 1 Tab by mouth every day. 30 Tab 10   • metformin (GLUCOPHAGE) 500 MG Tab Take 500 mg by mouth 2 times a day, with meals.     • aspirin EC (ECOTRIN) 81 MG Tablet Delayed Response Take 81 mg by mouth every day.     • Omega-3 Fatty Acids (FISH OIL ADULT GUMMIES PO) Take  by mouth.       No current facility-administered medications for this visit.        Social History   Substance Use Topics   • Smoking status: Never Smoker   • Smokeless tobacco: Never Used   • Alcohol use Yes      Comment: 1 per week     Social History     Social History Narrative   • No narrative on file       Family History   Problem Relation Age of Onset   • Diabetes Mother    • Cancer Mother         breast   • Lung Disease Father         COPD   • Diabetes Sister    • Heart Disease Neg Hx    • Stroke Neg Hx        ROS:     Constitutional:  Negative for fever, chills, unexpected weight change, and fatigue/generalized weakness.  HEENT:  Negative for headaches, vision changes, hearing changes, ear pain, ear discharge, rhinorrhea, sinus congestion, sore throat, and neck pain.    Respiratory: Negative for cough, sputum production, chest congestion, dyspnea, wheezing, and crackles.    Cardiovascular:Negative for chest pain, palpitations, orthopnea, and bilateral lower extremity edema.   Gastrointestinal:Negative for heartburn, nausea, vomiting, abdominal pain, hematochezia, melena, diarrhea, constipation, and greasy/foul-smelling stools.   Genitourinary: Negative for dysuria, polyuria, hematuria, pyuria, urinary urgency, and urinary incontinence.   Musculoskeletal:Negative for myalgias, back pain, and joint pain.   Skin: Negative for rash, itching, cyanotic skin color change.   Neurological: Negative for dizziness, tingling, tremors, focal sensory deficit, focal weakness and headaches.   Endo/Heme/Allergies: Does not bruise/bleed easily.   Psychiatric/Behavioral: Negative for depression, suicidal/homicidal  "ideation and memory loss.        EXAM:   /76 (BP Location: Right arm, Patient Position: Sitting, BP Cuff Size: Adult)   Pulse 88   Temp 36.4 °C (97.6 °F) (Temporal)   Resp 14   Ht 1.676 m (5' 6\")   Wt 91 kg (200 lb 9.6 oz)   SpO2 94%  Body mass index is 32.38 kg/m².    Constitutional: Well-developed and well-nourished. Not diaphoretic. No distress.   Skin: Skin is warm and dry. No rash noted.  Head: Atraumatic without lesions.  Eyes: Conjunctivae and extraocular motions are normal. Pupils are equal, round, and reactive to light. No scleral icterus.   Neck: Supple, trachea midline. No thyromegaly present. No cervical or supraclavicular lymphadenopathy.  Cardiovascular: Regular rate and rhythm.   Chest: Effort normal. Clear to auscultation throughout. No adventitious sounds.   Extremities: No cyanosis, clubbing, erythema, nor edema.   Neurological: Alert and oriented x 3. Sensation intact.   Psychiatric:  Behavior, mood, and affect are appropriate.       ASSESSMENT/PLAN:    1. Dyslipidemia  New to me, chronic uncontrolled.  Continue atorvastatin daily.  - CBC WITH DIFFERENTIAL; Future  - Comp Metabolic Panel; Future  - Lipid Profile; Future    2. Essential hypertension  New to me, chronic controlled.  Continue lisinopril  - CBC WITH DIFFERENTIAL; Future  - Comp Metabolic Panel; Future  - Lipid Profile; Future    3. Type 2 diabetes mellitus without complication, without long-term current use of insulin (HCC)  New to me, chronic controlled.  Continue metformin 500 mg twice daily.  Okay to take 2 pills at one time if unable to remember second dose.  Obtain records from [facility].  - POCT Hemoglobin A1C  - CBC WITH DIFFERENTIAL; Future  - Comp Metabolic Panel; Future  - HEMOGLOBIN A1C; Future  - Lipid Profile; Future  - MICROALBUMIN CREAT RATIO URINE; Future    4. Need for vaccination  - Hepatitis B Vaccine Adult IM    5. Encounter for screening for malignant neoplasm of breast  Patient requests 3D " screening.  - US-SCREENING WHOLE BREAST BILATERAL (3D SCREENING); Future    6. Broussard's esophagus without dysplasia  Chronic stable.  Continue to follow with Dr. Bello    7. Vitamin D deficiency  New to me, chronic uncontrolled.  Advised to increase in vitamin D supplementation to 3000 units daily    8. Obesity (BMI 30-39.9)  Chronic uncontrolled.  Have discussed diet and exercise interventions.  - OBESITY COUNSELING (No Charge): Patient identified as having weight management issue.  Appropriate orders and counseling given.      Return in about 6 months (around 11/9/2019).       Please note that this dictation was created using voice recognition software. I have made every reasonable attempt to correct obvious errors, but I expect that there are errors of grammar and possibly content that I did not discover before finalizing the note.

## 2019-05-09 NOTE — ASSESSMENT & PLAN NOTE
This is a new to me, chronic problem.  ASCVD score is 7.4%.  Patient is currently taking atorvastatin 10 mg daily.  Reports that her diet has been less than optimal.  Last labs in February shown below.    Patient and I then discussed necessary dietary changes to make to address dyslipidemia. She denies dizziness, generalized weakness/fatigue, vision/hearing changes, jaw pain/paresthesias, BUE pain/paresthesias/numbness/weakness, chest pain/pressure, palpitations, dyspnea, RUQ abdominal pain, oliguria/anuria, BLE edema.    Lab Results   Component Value Date/Time    CHOLSTRLTOT 206 (H) 02/08/2019 08:06 AM     (H) 02/08/2019 08:06 AM    HDL 68 02/08/2019 08:06 AM    TRIGLYCERIDE 94 02/08/2019 08:06 AM

## 2019-05-09 NOTE — ASSESSMENT & PLAN NOTE
Patient and I discussed the importance of lifestyle changes, with particular emphasis on decreasing sugar and carbohydrate intake and increasing plant-based nutrition (for the purposes of weight loss, general health, and prevention of chronic illnesses), as well as regular cardiovascular exercise, proper sleep, and stress management. Patient verbalized understanding.

## 2019-05-09 NOTE — ASSESSMENT & PLAN NOTE
New to me, well controlled on metformin.  She currently takes 500 mg twice daily however she reports that some days she only takes 500 mg.  I have advised doubling her dose in the morning if she is not going to take split doses.  She denies any side effects from medication.  Her A1c is down today for to 6.0 from 6.4 in February.  Her last eye exam was in August 2018 per patient no signs of retinopathy.  She denies any numbness, tingling in her extremities.  For her ASCVD risk she is on atorvastatin and aspirin 81 mg.  I have provided her with lab slips for her next lab draw prior to her visit in 6 months.

## 2019-05-13 ENCOUNTER — HOSPITAL ENCOUNTER (OUTPATIENT)
Dept: RADIOLOGY | Facility: MEDICAL CENTER | Age: 64
End: 2019-05-13
Attending: NURSE PRACTITIONER
Payer: COMMERCIAL

## 2019-05-13 DIAGNOSIS — Z12.31 SCREENING MAMMOGRAM, ENCOUNTER FOR: ICD-10-CM

## 2019-05-13 PROCEDURE — 77063 BREAST TOMOSYNTHESIS BI: CPT

## 2019-05-24 ENCOUNTER — APPOINTMENT (RX ONLY)
Dept: URBAN - METROPOLITAN AREA CLINIC 35 | Facility: CLINIC | Age: 64
Setting detail: DERMATOLOGY
End: 2019-05-24

## 2019-05-24 DIAGNOSIS — L57.0 ACTINIC KERATOSIS: ICD-10-CM

## 2019-05-24 PROBLEM — D48.5 NEOPLASM OF UNCERTAIN BEHAVIOR OF SKIN: Status: ACTIVE | Noted: 2019-05-24

## 2019-05-24 PROCEDURE — 17000 DESTRUCT PREMALG LESION: CPT | Mod: 59

## 2019-05-24 PROCEDURE — ? BIOPSY BY SHAVE METHOD

## 2019-05-24 PROCEDURE — 11102 TANGNTL BX SKIN SINGLE LES: CPT

## 2019-05-24 PROCEDURE — ? ADDITIONAL NOTES

## 2019-05-24 PROCEDURE — ? LIQUID NITROGEN

## 2019-05-24 PROCEDURE — ? COUNSELING

## 2019-05-24 ASSESSMENT — LOCATION SIMPLE DESCRIPTION DERM: LOCATION SIMPLE: LEFT FOREHEAD

## 2019-05-24 ASSESSMENT — LOCATION DETAILED DESCRIPTION DERM
LOCATION DETAILED: LEFT INFERIOR FOREHEAD
LOCATION DETAILED: LEFT FOREHEAD

## 2019-05-24 ASSESSMENT — LOCATION ZONE DERM: LOCATION ZONE: FACE

## 2019-05-24 NOTE — PROCEDURE: LIQUID NITROGEN
Render Note In Bullet Format When Appropriate: No
Post-Care Instructions: I reviewed with the patient in detail post-care instructions. Patient is to wear sunprotection, and avoid picking at any of the treated lesions. Pt may apply Vaseline to crusted or scabbing areas.
Number Of Freeze-Thaw Cycles: 1 freeze-thaw cycle
Consent: The patient's consent was obtained including but not limited to risks of crusting, scabbing, blistering, scarring, darker or lighter pigmentary change, recurrence, incomplete removal and infection.
Detail Level: Detailed
Duration Of Freeze Thaw-Cycle (Seconds): 10

## 2019-05-24 NOTE — PROCEDURE: ADDITIONAL NOTES
Detail Level: Detailed
Additional Notes: Discussed a field treatment of fluorouracil 5% topical cream with calcipotriene 0.005% topical cream for a period of Four days in the fall.

## 2019-05-24 NOTE — PROCEDURE: BIOPSY BY SHAVE METHOD
Render In Bullet Format When Appropriate: No
Anesthesia Volume In Cc: 0.3
Type Of Destruction Used: Curettage
Billing Type: Third-Party Bill
Electrodesiccation And Curettage Text: The wound bed was treated with electrodesiccation and curettage after the biopsy was performed.
Was A Bandage Applied: Yes
Biopsy Method: double edge Personna blade
Post-Care Instructions: I reviewed with the patient in detail post-care instructions. Keep the biopsy site dry overnight, and then change the dressing once daily and apply a thin layer of petrolatum with a clean q-tip. \\nShowers are OK after 24 hours.  Allow clean water to run over the area.  Do not touch the biopsy site with your hands.  Do not immerse the biopsy site in water such as going into a swimming pool or bathtub until the sutures are removed.  If the biopsy site gets more painful with time, red, or drains, this is concerning for infection.  If you have signs of infection please call the office to come in for a walk in visit Monday through Friday 8:30 am to 12 pm or 1 pm to 4:30 pm.  If we are not in the office, please call through the answering service.
Dressing: bandage
Detail Level: Detailed
Silver Nitrate Text: The wound bed was treated with silver nitrate after the biopsy was performed.
Hemostasis: Aluminum Chloride
Notification Instructions: Patient will be notified of biopsy results. However, patient instructed to call the office if not contacted within 2 weeks.
Curettage Text: The wound bed was treated with curettage after the biopsy was performed.
Size Of Lesion In Cm: 0.4
Consent: Written consent was obtained and risks were reviewed including but not limited to scarring, infection, bleeding, scabbing, incomplete removal, nerve damage and allergy to anesthesia.
Anesthesia Type: 1% lidocaine with epinephrine and a 1:10 solution of 8.4% sodium bicarbonate
Lab: [id number]
Depth Of Biopsy: dermis
Cryotherapy Text: The wound bed was treated with cryotherapy after the biopsy was performed.
X Size Of Lesion In Cm: 0
Biopsy Type: H and E
Wound Care: Petrolatum
Lab Facility: 
Electrodesiccation Text: The wound bed was treated with electrodesiccation after the biopsy was performed.

## 2019-05-31 NOTE — TELEPHONE ENCOUNTER
Was the patient seen in the last year in this department? Yes LOV 05/09/2019    Does patient have an active prescription for medications requested? Yes    Received Request Via: Pharmacy     METFORMIN  MG TABLET

## 2019-06-26 RX ORDER — LISINOPRIL 20 MG/1
TABLET ORAL
Qty: 90 TAB | Refills: 1 | Status: SHIPPED | OUTPATIENT
Start: 2019-06-26 | End: 2019-12-10 | Stop reason: SDUPTHER

## 2019-07-02 ENCOUNTER — OFFICE VISIT (OUTPATIENT)
Dept: URGENT CARE | Facility: CLINIC | Age: 64
End: 2019-07-02
Payer: COMMERCIAL

## 2019-07-02 VITALS
SYSTOLIC BLOOD PRESSURE: 130 MMHG | WEIGHT: 200 LBS | OXYGEN SATURATION: 100 % | HEIGHT: 66 IN | TEMPERATURE: 98.9 F | RESPIRATION RATE: 16 BRPM | HEART RATE: 104 BPM | DIASTOLIC BLOOD PRESSURE: 88 MMHG | BODY MASS INDEX: 32.14 KG/M2

## 2019-07-02 DIAGNOSIS — B34.9 VIRAL ILLNESS: ICD-10-CM

## 2019-07-02 DIAGNOSIS — M79.10 MYALGIA: ICD-10-CM

## 2019-07-02 DIAGNOSIS — R59.0 CERVICAL LYMPHADENOPATHY: ICD-10-CM

## 2019-07-02 LAB
FLUAV+FLUBV AG SPEC QL IA: NORMAL
HETEROPH AB SER QL LA: NEGATIVE
INT CON NEG: NEGATIVE
INT CON POS: POSITIVE
S PYO AG THROAT QL: NEGATIVE

## 2019-07-02 PROCEDURE — 86308 HETEROPHILE ANTIBODY SCREEN: CPT | Performed by: PHYSICIAN ASSISTANT

## 2019-07-02 PROCEDURE — 99213 OFFICE O/P EST LOW 20 MIN: CPT | Performed by: PHYSICIAN ASSISTANT

## 2019-07-02 PROCEDURE — 87804 INFLUENZA ASSAY W/OPTIC: CPT | Performed by: PHYSICIAN ASSISTANT

## 2019-07-02 PROCEDURE — 87880 STREP A ASSAY W/OPTIC: CPT | Performed by: PHYSICIAN ASSISTANT

## 2019-07-02 ASSESSMENT — ENCOUNTER SYMPTOMS
SINUS PAIN: 1
MYALGIAS: 1
NAUSEA: 0
COUGH: 1
FEVER: 0
NECK PAIN: 1
EYE REDNESS: 0
SORE THROAT: 1
SHORTNESS OF BREATH: 0
ABDOMINAL PAIN: 0
EYE DISCHARGE: 0
HEADACHES: 0
RHINORRHEA: 1
VOMITING: 0

## 2019-07-02 NOTE — PROGRESS NOTES
Subjective:      Alisa Vasquez is a 63 y.o. female who presents with URI (cough, congestion, popping in ears, body aches, swollen glands )          URI    This is a new problem. Episode onset: x 3 days. The problem has been unchanged. There has been no fever. Associated symptoms include congestion (improving), coughing (improving), neck pain (secondary to enlarged lymph nodes.), a plugged ear sensation, rhinorrhea (improved), sinus pain (improving), sneezing (improved) and a sore throat (improving). Pertinent negatives include no abdominal pain, chest pain, ear pain, headaches, nausea, rash or vomiting. Treatments tried: OCT cough/cold medication - Mucinex.     The patient presents to clinic c/o cough, congestion, sinus pain, and sore throat x 3 days ago. The patient states these symptoms are improved. The patient states she developed body aches and enlarged, tender lymph nodes yesterday. She reports no fever. The patient denies shortness of breath, chest pain, and headache.     PMH:  has a past medical history of Cancer (HCC) (2009); Diabetes (HCC); and Hypertension.  MEDS:   Current Outpatient Prescriptions:   •  lisinopril (PRINIVIL) 20 MG Tab, TAKE 1 TABLET BY MOUTH EVERY DAY, Disp: 90 Tab, Rfl: 1  •  metFORMIN (GLUCOPHAGE) 500 MG Tab, TAKE 1 TABLET BY MOUTH TWICE A DAY, Disp: 60 Tab, Rfl: 3  •  omeprazole (PRILOSEC) 20 MG delayed-release capsule, Take 1 Cap by mouth 2 Times a Day., Disp: 30 Cap, Rfl: 10  •  atorvastatin (LIPITOR) 10 MG Tab, Take 1 Tab by mouth every day., Disp: 30 Tab, Rfl: 10  •  Omega-3 Fatty Acids (FISH OIL ADULT GUMMIES PO), Take  by mouth., Disp: , Rfl:   •  aspirin EC (ECOTRIN) 81 MG Tablet Delayed Response, Take 81 mg by mouth every day., Disp: , Rfl:   ALLERGIES: No Known Allergies  SURGHX:   Past Surgical History:   Procedure Laterality Date   • GANGLION EXCISION Right 12/23/2016    Procedure: GANGLION EXCISION - DORSAL WRIST;  Surgeon: Marquez Jauregui M.D.;  Location: SURGERY  "[facility];  Service:    • HAND SURGERY Right 2004    thumb and wrist   • HYSTERECTOMY, TOTAL ABDOMINAL  1992   • APPENDECTOMY  1990   • SHOULDER ARTHROSCOPY Right 1998/2002    rotator cuff repair     SOCHX:  reports that she has never smoked. She has never used smokeless tobacco. She reports that she drinks alcohol. She reports that she does not use drugs.  FH: Family history was reviewed, no pertinent findings to report    Review of Systems   Constitutional: Negative for fever.   HENT: Positive for congestion (improving), rhinorrhea (improved), sinus pain (improving), sneezing (improved) and sore throat (improving). Negative for ear pain.    Eyes: Negative for discharge and redness.   Respiratory: Positive for cough (improving). Negative for shortness of breath.    Cardiovascular: Negative for chest pain and leg swelling.   Gastrointestinal: Negative for abdominal pain, nausea and vomiting.   Musculoskeletal: Positive for myalgias and neck pain (secondary to enlarged lymph nodes.).   Skin: Negative for rash.   Neurological: Negative for headaches.          Objective:     /88   Pulse (!) 104   Temp 37.2 °C (98.9 °F)   Resp 16   Ht 1.676 m (5' 6\")   Wt 90.7 kg (200 lb)   LMP 05/09/1987 (Approximate)   SpO2 100%   BMI 32.28 kg/m²      Physical Exam   Constitutional: She is oriented to person, place, and time. She appears well-developed and well-nourished. No distress.   HENT:   Head: Normocephalic and atraumatic.   Right Ear: Tympanic membrane, external ear and ear canal normal.   Left Ear: Tympanic membrane, external ear and ear canal normal.   Mouth/Throat: Mucous membranes are normal. Posterior oropharyngeal erythema present. No tonsillar exudate.   Eyes: Conjunctivae and EOM are normal.   Neck: Normal range of motion. Neck supple.   Cardiovascular: Normal rate, regular rhythm and normal heart sounds.    Pulmonary/Chest: Effort normal and breath sounds normal.   Musculoskeletal: Normal range " of motion.   Lymphadenopathy:     She has cervical adenopathy.        Right cervical: Posterior cervical adenopathy present.        Left cervical: Posterior cervical adenopathy present.   Neurological: She is alert and oriented to person, place, and time.   Skin: Skin is warm and dry.        Progress:  POCT Rapid Strep: Negative  POCT Rapid Flu: Negative  POCT Mono: Negative     Assessment/Plan:     1. Viral illness    2. Myalgia    - POCT Rapid Strep A  - POCT Influenza A/B  - POCT Mononucleosis (mono)    3. Cervical lymphadenopathy    The patient's presenting symptoms and physical exam are consistent with a viral illness.  The patient's rapid strep testing clinic was was negative, indicating her symptoms are unlikely due to strep pharyngitis.  The patient's rapid flu test was also negative, indicating her symptoms are likely due to to the influenza virus.  Additionally, the patient's POCT mono test was negative, indicating her symptoms are unlikely due to to acute mononucleosis.  Given the patient is currently not experiencing a productive cough, shortness of breath, or fever, and the presence of clear lung sounds and a normal pulse ox on physical exam, it is unlikely the patient's symptoms are due to an acute lower respiratory tract infection.  The patient's body aches are likely related to her concurrent viral illness.  On physical exam, the patient had enlarged post anterior cervical lymph nodes without overlying erythema or swelling.  The patient's enlarged lymph nodes are also likely related to her concurrent viral illness.  Recommend the patient monitor her symptoms and follow-up with her PCP as needed.  Recommend OTC medications and supportive care for symptom medic management.  Discussed strict return precautions with the patient, and she verbalized understanding.    OTC Tylenol or Motrin for fever/discomfort.  OTC cough/cold medication for symptomatic relief, such as Mucinex  OTC Supportive Care for  Congestion - saline nasal spray or neti pot  Follow-up with PCP  Rest  Drink plenty of fluids  Return to clinic or go to the ED if symptoms worsen or fail to improve, or if patient should develop worsening/increasing cough, worsening/increasing sore throat, increased muscle aches, shortness of breath, chest pain, abdominal pain, nausea/vomiting, urinary symptoms, fever/chills, and/or any concerning symptoms.    Discussed plan with the patient, and she agrees to the above.

## 2019-07-09 ENCOUNTER — APPOINTMENT (RX ONLY)
Dept: URBAN - METROPOLITAN AREA CLINIC 36 | Facility: CLINIC | Age: 64
Setting detail: DERMATOLOGY
End: 2019-07-09

## 2019-07-09 PROBLEM — C44.319 BASAL CELL CARCINOMA OF SKIN OF OTHER PARTS OF FACE: Status: ACTIVE | Noted: 2019-07-09

## 2019-07-09 PROCEDURE — 17311 MOHS 1 STAGE H/N/HF/G: CPT

## 2019-07-09 PROCEDURE — ? MOHS SURGERY

## 2019-07-09 PROCEDURE — 13132 CMPLX RPR F/C/C/M/N/AX/G/H/F: CPT

## 2019-07-09 NOTE — PROCEDURE: MOHS SURGERY
Unique Flap 1 Name: Myocutaneous Island pedicle Flap
Island Pedicle Flap-Requiring Vessel Identification Text: The defect edges were debeveled with a #15 scalpel blade.  Given the location of the defect, shape of the defect and the proximity to free margins an island pedicle advancement flap was deemed most appropriate.  Using a sterile surgical marker, an appropriate advancement flap was drawn, based on the axial vessel mentioned above, incorporating the defect, outlining the appropriate donor tissue and placing the expected incisions within the relaxed skin tension lines where possible.    The area thus outlined was incised deep to adipose tissue with a #15 scalpel blade.  The skin margins were undermined to an appropriate distance in all directions around the primary defect and laterally outward around the island pedicle utilizing iris scissors.  There was minimal undermining beneath the pedicle flap.
Validate That Anesthesia Volume Is Not Zero (If You Leave At 0 It Will Not Render In Note): No
Repair Performed By Another Provider Text (Leave Blank If You Do Not Want): After obtaining clear surgical margins the defect was repaired by another provider.
Mid-Level Procedure Text (D): After obtaining clear surgical margins the patient was sent to a mid-level provider for surgical repair.  The patient understands they will receive post-surgical care and follow-up from the mid-level provider.
Number Of Stages: 1
Suture Removal: 7 days
Muscle Hinge Flap Text: The defect edges were debeveled with a #15 scalpel blade.  Given the size, depth and location of the defect and the proximity to free margins a muscle hinge flap was deemed most appropriate.  Using a sterile surgical marker, an appropriate hinge flap was drawn incorporating the defect. The area thus outlined was incised with a #15 scalpel blade.  The skin margins were undermined to an appropriate distance in all directions utilizing iris scissors.
Bcc Infiltrative Histology Text: There were numerous aggregates of basaloid cells demonstrating an infiltrative pattern.
Crescentic Complex Repair Preamble Text (Leave Blank If You Do Not Want): Extensive wide undermining was performed at least 2 cm in all directions.
Ftsg Text: The defect edges were debeveled with a #15 scalpel blade.  Given the location of the defect, shape of the defect and the proximity to free margins a full thickness skin graft was deemed most appropriate.  Using a sterile surgical marker, the primary defect shape was transferred to the donor site. The area thus outlined was incised deep to adipose tissue with a #15 scalpel blade.  The harvested graft was then trimmed of adipose tissue until only dermis and epidermis was left.  The skin margins of the secondary defect were undermined to an appropriate distance in all directions utilizing iris scissors.  The secondary defect was closed with interrupted buried subcutaneous sutures.  The skin edges were then re-apposed with running  sutures.  The skin graft was then placed in the primary defect and oriented appropriately.
Show Mohs Rapid Report Variable In The Stage Tabs (Ensure You Have This How You Like Before You Hide): Yes
Graft Donor Site Epidermal Sutures (Optional): 5-0 Ethibond
Repair Anesthesia Type: 1% lidocaine with 1:100,000 epinephrine and 408mcg clindamycin/ml and a 1:10 solution of 8.4% sodium bicarbonate
Consent 3/Introductory Paragraph: I gave the patient a chance to ask questions they had about the procedure.  Following this I explained the Mohs procedure and consent was obtained. The risks, benefits and alternatives to therapy were discussed in detail. Specifically, the risks of infection, scarring, bleeding, prolonged wound healing, incomplete removal, allergy to anesthesia, nerve injury and recurrence were addressed. Prior to the procedure, the treatment site was clearly identified and confirmed by the patient. All components of Universal Protocol/PAUSE Rule completed.
Stage 6: Number Of Blocks?: 0
Pain Refusal Text: I offered to prescribe pain medication but the patient refused to take this medication.
Unique Flap 1 Text: A decision was made to reconstruct the defect utilizing a myocutaneous Island pedicle Flap based on the levator labii superioris muscle.  A telfa template was made of the defect.  This telfa template was then used to outline the myocutaneous flap, based along the meilolabial fold.  The donor area for the pedicle flap was then injected with anesthesia.  The flap was excised through the skin and subcutaneous tissue down to the layer of the underlying musculature.  The myocutaneous flap was carefully excised within this deep plane to maintain its blood supply. Based on the muscle. The edges of the donor site were undermined.   The donor site was closed in a primary fashion to the point of transposition.  The pedicle was then transposed into position and sutured.  Once the flap was sutured into place, adequate blood supply was confirmed with blanching and refill.
Mohs Rapid Report Verbiage: The area of clinically evident tumor was marked with skin marking ink and appropriately hatched.  The initial incision was made following the Mohs approach through the skin.  The specimen was taken to the lab, divided into the necessary number of pieces, chromacoded and processed according to the Mohs protocol.  This was repeated in successive stages until a tumor free defect was achieved.
Double O-Z Plasty Text: The defect edges were debeveled with a #15 scalpel blade.  Given the location of the defect, shape of the defect and the proximity to free margins a Double O-Z plasty (double transposition flap) was deemed most appropriate.  Using a sterile surgical marker, the appropriate transposition flaps were drawn incorporating the defect and placing the expected incisions within the relaxed skin tension lines where possible. The area thus outlined was incised deep to adipose tissue with a #15 scalpel blade.  The skin margins were undermined to an appropriate distance in all directions utilizing iris scissors.  Hemostasis was achieved with electrocautery.  The flaps were then transposed into place, one clockwise and the other counterclockwise, and anchored with interrupted buried subcutaneous sutures.
Chonodrocutaneous Helical Advancement Flap Text: The defect edges were debeveled with a #15 scalpel blade.  Given the location of the defect and the proximity to free margins a chondrocutaneous helical advancement flap was deemed most appropriate.  Using a sterile surgical marker, the appropriate advancement flap was drawn incorporating the defect and placing the expected incisions within the relaxed skin tension lines where possible.    The area thus outlined was incised deep to adipose tissue with a #15 scalpel blade.  The skin margins were undermined to an appropriate distance in all directions utilizing iris scissors.
Epidermal Closure: running cuticular
Stage 15: Additional Anesthesia Type: 1% lidocaine with epinephrine
Oculoplastic Surgeon Procedure Text (D): After obtaining clear surgical margins the patient was sent to oculoplastics for surgical repair.  The patient understands they will receive post-surgical care and follow-up from the referring physician's office.
Hemostasis: Electrocautery
Wound Care (No Sutures): Petrolatum
Crescentic Intermediate Repair Preamble Text (Leave Blank If You Do Not Want): Undermining was performed with blunt dissection.
Epidermal Autograft Text: The defect edges were debeveled with a #15 scalpel blade.  Given the location of the defect, shape of the defect and the proximity to free margins an epidermal autograft was deemed most appropriate.  Using a sterile surgical marker, the primary defect shape was transferred to the donor site. The epidermal graft was then harvested.  The skin graft was then placed in the primary defect and oriented appropriately.
Bi-Rhombic Flap Text: The defect edges were debeveled with a #15 scalpel blade.  Given the location of the defect and the proximity to free margins a bi-rhombic flap was deemed most appropriate.  Using a sterile surgical marker, an appropriate rhombic flap was drawn incorporating the defect. The area thus outlined was incised deep to adipose tissue with a #15 scalpel blade.  The skin margins were undermined to an appropriate distance in all directions utilizing iris scissors.
Graft Donor Site Bandage (Optional-Leave Blank If You Don't Want In Note): Aquaphor and telefa placed on wound. Pressure dressing applied to donor site
Consent (Near Eyelid Margin)/Introductory Paragraph: The rationale for Mohs was explained to the patient and consent was obtained. The risks, benefits and alternatives to therapy were discussed in detail. Specifically, the risks of ectropion or eyelid deformity, infection, scarring, bleeding, prolonged wound healing, incomplete removal, allergy to anesthesia, nerve injury and recurrence were addressed. Prior to the procedure, the treatment site was clearly identified and confirmed by the patient. All components of Universal Protocol/PAUSE Rule completed.
Manual Repair Warning Statement: We plan on removing the manually selected variable below in favor of our much easier automatic structured text blocks found in the previous tab. We decided to do this to help make the flow better and give you the full power of structured data. Manual selection is never going to be ideal in our platform and I would encourage you to avoid using manual selection from this point on, especially since I will be sunsetting this feature. It is important that you do one of two things with the customized text below. First, you can save all of the text in a word file so you can have it for future reference. Second, transfer the text to the appropriate area in the Library tab. Lastly, if there is a flap or graft type which we do not have you need to let us know right away so I can add it in before the variable is hidden. No need to panic, we plan to give you roughly 6 months to make the change.
Mohs Histo Method Verbiage: Each section was then chromacoded and processed in the Mohs lab using the Mohs protocol and submitted for frozen section.
W Plasty Text: The lesion was extirpated to the level of the fat with a #15 scalpel blade.  Given the location of the defect, shape of the defect and the proximity to free margins a W-plasty was deemed most appropriate for repair.  Using a sterile surgical marker, the appropriate transposition arms of the W-plasty were drawn incorporating the defect and placing the expected incisions within the relaxed skin tension lines where possible.    The area thus outlined was incised deep to adipose tissue with a #15 scalpel blade.  The skin margins were undermined to an appropriate distance in all directions utilizing iris scissors.  The opposing transposition arms were then transposed into place in opposite direction and anchored with interrupted buried subcutaneous sutures.
O-L Flap Text: The defect edges were debeveled with a #15 scalpel blade.  Given the location of the defect, shape of the defect and the proximity to free margins an O-L flap was deemed most appropriate.  Using a sterile surgical marker, an appropriate advancement flap was drawn incorporating the defect and placing the expected incisions within the relaxed skin tension lines where possible.    The area thus outlined was incised deep to adipose tissue with a #15 scalpel blade.  The skin margins were undermined to an appropriate distance in all directions utilizing iris scissors.
Surgeon/Pathologist Verbiage (Will Incorporate Name Of Surgeon From Intro If Not Blank): operated in two distinct and integrated capacities as the surgeon and pathologist.
Z Plasty Text: The lesion was extirpated to the level of the fat with a #15 scalpel blade.  Given the location of the defect, shape of the defect and the proximity to free margins a Z-plasty was deemed most appropriate for repair.  Using a sterile surgical marker, the appropriate transposition arms of the Z-plasty were drawn incorporating the defect and placing the expected incisions within the relaxed skin tension lines where possible.    The area thus outlined was incised deep to adipose tissue with a #15 scalpel blade.  The skin margins were undermined to an appropriate distance in all directions utilizing iris scissors.  The opposing transposition arms were then transposed into place in opposite direction and anchored with interrupted buried subcutaneous sutures.
O-Z Flap Text: The defect edges were debeveled with a #15 scalpel blade.  Given the location of the defect, shape of the defect and the proximity to free margins an O-Z flap was deemed most appropriate.  Using a sterile surgical marker, an appropriate transposition flap was drawn incorporating the defect and placing the expected incisions within the relaxed skin tension lines where possible. The area thus outlined was incised deep to adipose tissue with a #15 scalpel blade.  The skin margins were undermined to an appropriate distance in all directions utilizing iris scissors.
Asc Procedure Text (E): After obtaining clear surgical margins the patient was sent to an ASC for surgical repair.  The patient understands they will receive post-surgical care and follow-up from the ASC physician.
Wound Care: Aquaphor
Graft Cartilage Fenestration Text: The cartilage was fenestrated with a 2mm punch biopsy to help facilitate graft survival and healing.
Purse String (Simple) Text: Given the location of the defect and the characteristics of the surrounding skin a purse string closure was deemed most appropriate.  Undermining was performed circumfirentially around the surgical defect.  A purse string suture was then placed and tightened.
Bilobed Flap Text: The defect edges were debeveled with a #15 scalpel blade.  Given the location of the defect and the proximity to free margins a bilobe flap was deemed most appropriate.  Using a sterile surgical marker, an appropriate bilobe flap drawn around the defect.    The area thus outlined was incised deep to adipose tissue with a #15 scalpel blade.  The skin margins were undermined to an appropriate distance in all directions utilizing iris scissors.
Information: Selecting Yes will display possible errors in your note based on the variables you have selected. This validation is only offered as a suggestion for you. PLEASE NOTE THAT THE VALIDATION TEXT WILL BE REMOVED WHEN YOU FINALIZE YOUR NOTE. IF YOU WANT TO FAX A PRELIMINARY NOTE YOU WILL NEED TO TOGGLE THIS TO 'NO' IF YOU DO NOT WANT IT IN YOUR FAXED NOTE.
Deep Sutures: 4-0 Vicryl
Detail Level: Detailed
Otolaryngologist Procedure Text (B): After obtaining clear surgical margins the patient was sent to otolaryngology for surgical repair.  The patient understands they will receive post-surgical care and follow-up from the referring physician's office.
Location Indication Override (Is Already Calculated Based On Selected Body Location): Area M
Melolabial Interpolation Flap Text: A decision was made to reconstruct the defect utilizing an interpolation axial flap and a staged reconstruction.  A telfa template was made of the defect.  This telfa template was then used to outline the melolabial interpolation flap.  The donor area for the pedicle flap was then injected with anesthesia.  The flap was excised through the skin and subcutaneous tissue down to the layer of the underlying musculature.  The pedicle flap was carefully excised within this deep plane to maintain its blood supply.  The edges of the donor site were undermined.   The donor site was closed in a primary fashion.  The pedicle was then rotated into position and sutured.  Once the tube was sutured into place, adequate blood supply was confirmed with blanching and refill.  The pedicle was then wrapped with xeroform gauze and dressed appropriately with a telfa and gauze bandage to ensure continued blood supply and protect the attached pedicle.
Mercedes Flap Text: The defect edges were debeveled with a #15 scalpel blade.  Given the location of the defect, shape of the defect and the proximity to free margins a Mercedes flap was deemed most appropriate.  Using a sterile surgical marker, an appropriate advancement flap was drawn incorporating the defect and placing the expected incisions within the relaxed skin tension lines where possible. The area thus outlined was incised deep to adipose tissue with a #15 scalpel blade.  The skin margins were undermined to an appropriate distance in all directions utilizing iris scissors.
No Residual Tumor Seen Histology Text: There were no malignant cells seen in the sections examined.
Oculoplastic Surgeon (A): Nomi
Plastic Surgeon Procedure Text (E): After obtaining clear surgical margins the patient was sent to plastics for surgical repair.  The patient understands they will receive post-surgical care and follow-up from the referring physician's office.
Localized Dermabrasion With Wire Brush Text: The patient was draped in routine manner.  Localized dermabrasion using 3 x 17 mm wire brush was performed in routine manner to papillary dermis. This spot dermabrasion is being performed to complete skin cancer reconstruction. It also will eliminate the other sun damaged precancerous cells that are known to be part of the regional effect of a lifetime's worth of sun exposure. This localized dermabrasion is therapeutic and should not be considered cosmetic in any regard.
Island Pedicle Flap Text: The defect edges were debeveled with a #15 scalpel blade.  Given the location of the defect, shape of the defect and the proximity to free margins an island pedicle advancement flap was deemed most appropriate.  Using a sterile surgical marker, an appropriate advancement flap was drawn incorporating the defect, outlining the appropriate donor tissue and placing the expected incisions within the relaxed skin tension lines where possible.    The area thus outlined was incised deep to adipose tissue with a #15 scalpel blade.  The skin margins were undermined to an appropriate distance in all directions around the primary defect and laterally outward around the island pedicle utilizing iris scissors.  There was minimal undermining beneath the pedicle flap.
Surgical Defect Width In Cm (Optional): 0.6
Cheiloplasty (Less Than 50%) Text: A decision was made to reconstruct the defect with a  cheiloplasty.  The defect was undermined extensively.  Additional obicularis oris muscle was excised with a 15 blade scalpel.  The defect was converted into a full thickness wedge, of less than 50% of the vertical height of the lip, to facilite a better cosmetic result.  Small vessels were then tied off with 5-0 monocyrl. The obicularis oris, superficial fascia, adipose and dermis were then reapproximated.  After the deeper layers were approximated the epidermis was reapproximated with particular care given to realign the vermilion border.
Rotation Flap Text: The defect edges were debeveled with a #15 scalpel blade.  Given the location of the defect, shape of the defect and the proximity to free margins a rotation flap was deemed most appropriate.  Using a sterile surgical marker, an appropriate rotation flap was drawn incorporating the defect and placing the expected incisions within the relaxed skin tension lines where possible.    The area thus outlined was incised deep to adipose tissue with a #15 scalpel blade.  The skin margins were undermined to an appropriate distance in all directions utilizing iris scissors.
Medical Necessity Statement: Based on my medical judgement, Mohs surgery is the most appropriate treatment for this cancer compared to other treatments.
Area M Indication Text: Tumors in this location are included in Area M (cheek, forehead, scalp, neck, jawline and pretibial skin).  Mohs surgery is indicated for tumors in these anatomic locations.
Cheiloplasty (Complex) Text: A decision was made to reconstruct the defect with a  cheiloplasty.  The defect was undermined extensively.  Additional obicularis oris muscle was excised with a 15 blade scalpel.  The defect was converted into a full thickness wedge to facilite a better cosmetic result.  Small vessels were then tied off with 5-0 monocyrl. The obicularis oris, superficial fascia, adipose and dermis were then reapproximated.  After the deeper layers were approximated the epidermis was reapproximated with particular care given to realign the vermilion border.
Alternatives Discussed Intro (Do Not Add Period): I discussed alternative treatments to Mohs surgery and specifically discussed the risks and benefits of
Area L Indication Text: Tumors in this location are included in Area L (trunk and extremities).  Mohs surgery is indicated for larger tumors, or tumors with aggressive histologic features, in these anatomic locations.
Simple / Intermediate / Complex Repair - Final Wound Length In Cm: 3.1
Unique Flap 2 Name: Peng Flap
Keystone Flap Text: The defect edges were debeveled with a #15 scalpel blade.  Given the location of the defect, shape of the defect a keystone flap was deemed most appropriate.  Using a sterile surgical marker, an appropriate keystone flap was drawn incorporating the defect, outlining the appropriate donor tissue and placing the expected incisions within the relaxed skin tension lines where possible. The area thus outlined was incised deep to adipose tissue with a #15 scalpel blade.  The skin margins were undermined to an appropriate distance in all directions around the primary defect and laterally outward around the flap utilizing iris scissors.
Advancement Flap (Single) Text: The defect edges were debeveled with a #15 scalpel blade.  Given the location of the defect and the proximity to free margins a single advancement flap was deemed most appropriate.  Using a sterile surgical marker, an appropriate advancement flap was drawn incorporating the defect and placing the expected incisions within the relaxed skin tension lines where possible.    The area thus outlined was incised deep to adipose tissue with a #15 scalpel blade.  The skin margins were undermined to an appropriate distance in all directions utilizing iris scissors.
Primary Defect Length In Cm (Final Defect Size - Required For Flaps/Grafts): 0.8
Mauc Instructions: By selecting yes to the question below the MAUC number will be added into the note.  This will be calculated automatically based on the diagnosis chosen, the size entered, the body zone selected (H,M,L) and the specific indications you chose. You will also have the option to override the Mohs AUC if you disagree with the automatically calculated number and this option is found in the Case Summary tab.
Melolabial Transposition Flap Text: The defect edges were debeveled with a #15 scalpel blade.  Given the location of the defect and the proximity to free margins a melolabial flap was deemed most appropriate.  Using a sterile surgical marker, an appropriate melolabial transposition flap was drawn incorporating the defect.    The area thus outlined was incised deep to adipose tissue with a #15 scalpel blade.  The skin margins were undermined to an appropriate distance in all directions utilizing iris scissors.
Split-Thickness Skin Graft Text: The defect edges were debeveled with a #15 scalpel blade.  Given the location of the defect, shape of the defect and the proximity to free margins a split thickness skin graft was deemed most appropriate.  Using a sterile surgical marker, the primary defect shape was transferred to the donor site. The split thickness graft was then harvested.  The skin graft was then placed in the primary defect and oriented appropriately.
Closure 4 Information: This tab is for additional flaps and grafts above and beyond our usual structured repairs.  Please note if you enter information here it will not currently bill and you will need to add the billing information manually.
Consent (Temporal Branch)/Introductory Paragraph: The rationale for Mohs was explained to the patient and consent was obtained. The risks, benefits and alternatives to therapy were discussed in detail. Specifically, the risks of damage to the temporal branch of the facial nerve, infection, scarring, bleeding, prolonged wound healing, incomplete removal, allergy to anesthesia, and recurrence were addressed. Prior to the procedure, the treatment site was clearly identified and confirmed by the patient. All components of Universal Protocol/PAUSE Rule completed.
Retention Suture Bite Size: 3 mm
Unique Flap 2 Text: A decision was made to reconstruct the defect utilizing a Peng Flap (Bilateral Advancement Rotation Flap). Given the location of the defect and the proximity to free margins, this flap was deemed most appropriate.  Using a sterile surgical marker, the appropriate rotation flaps were drawn incorporating the defect and placing the expected incisions within the relaxed skin tension lines where possible.    The area thus outlined was incised deep to adipose tissue with a #15 scalpel blade.  The skin margins were undermined to an appropriate distance in all directions utilizing iris scissors.
S Plasty Text: Given the location and shape of the defect, and the orientation of relaxed skin tension lines, an S-plasty was deemed most appropriate for repair.  Using a sterile surgical marker, the appropriate outline of the S-plasty was drawn, incorporating the defect and placing the expected incisions within the relaxed skin tension lines where possible.  The area thus outlined was incised deep to adipose tissue with a #15 scalpel blade.  The skin margins were undermined to an appropriate distance in all directions utilizing iris scissors. The skin flaps were advanced over the defect.  The opposing margins were then approximated with interrupted buried subcutaneous sutures.
Crescentic Advancement Flap Text: The defect edges were debeveled with a #15 scalpel blade.  Given the location of the defect and the proximity to free margins a crescentic advancement flap was deemed most appropriate.  Using a sterile surgical marker, the appropriate advancement flap was drawn incorporating the defect and placing the expected incisions within the relaxed skin tension lines where possible.    The area thus outlined was incised deep to adipose tissue with a #15 scalpel blade.  The skin margins were undermined to an appropriate distance in all directions utilizing iris scissors.
Dermal Autograft Text: The defect edges were debeveled with a #15 scalpel blade.  Given the location of the defect, shape of the defect and the proximity to free margins a dermal autograft was deemed most appropriate.  Using a sterile surgical marker, the primary defect shape was transferred to the donor site. The area thus outlined was incised deep to adipose tissue with a #15 scalpel blade.  The harvested graft was then trimmed of adipose and epidermal tissue until only dermis was left.  The skin graft was then placed in the primary defect and oriented appropriately.
Helical Rim Advancement Flap Text: The defect edges were debeveled with a #15 blade scalpel.  Given the location of the defect and the proximity to free margins (helical rim) a double helical rim advancement flap was deemed most appropriate.  Using a sterile surgical marker, the appropriate advancement flaps were drawn incorporating the defect and placing the expected incisions between the helical rim and antihelix where possible.  The area thus outlined was incised through and through with a #15 scalpel blade.  With a skin hook and iris scissors, the flaps were gently and sharply undermined and freed up.
Consent (Ear)/Introductory Paragraph: The rationale for Mohs was explained to the patient and consent was obtained. The risks, benefits and alternatives to therapy were discussed in detail. Specifically, the risks of ear deformity, infection, scarring, bleeding, prolonged wound healing, incomplete removal, allergy to anesthesia, nerve injury and recurrence were addressed. Prior to the procedure, the treatment site was clearly identified and confirmed by the patient. All components of Universal Protocol/PAUSE Rule completed.
Cheek Interpolation Flap Text: A decision was made to reconstruct the defect utilizing an interpolation axial flap and a staged reconstruction.  A telfa template was made of the defect.  This telfa template was then used to outline the Cheek Interpolation flap.  The donor area for the pedicle flap was then injected with anesthesia.  The flap was excised through the skin and subcutaneous tissue down to the layer of the underlying musculature.  The interpolation flap was carefully excised within this deep plane to maintain its blood supply.  The edges of the donor site were undermined.   The donor site was closed in a primary fashion.  The pedicle was then rotated into position and sutured.  Once the tube was sutured into place, adequate blood supply was confirmed with blanching and refill.  The pedicle was then wrapped with xeroform gauze and dressed appropriately with a telfa and gauze bandage to ensure continued blood supply and protect the attached pedicle.
Double O-Z Flap Text: The defect edges were debeveled with a #15 scalpel blade.  Given the location of the defect, shape of the defect and the proximity to free margins a Double O-Z flap was deemed most appropriate.  Using a sterile surgical marker, an appropriate transposition flap was drawn incorporating the defect and placing the expected incisions within the relaxed skin tension lines where possible. The area thus outlined was incised deep to adipose tissue with a #15 scalpel blade.  The skin margins were undermined to an appropriate distance in all directions utilizing iris scissors.
Suturegard Intro: Intraoperative tissue expansion was performed, utilizing the SUTUREGARD device, in order to reduce wound tension.
Anesthesia Type: 0.5% lidocaine with 1:200,000 epinephrine and a 1:10 solution of 8.4% sodium bicarbonate and 408mcg clindamycin/ml
Secondary Intention Text (Leave Blank If You Do Not Want): The defect will heal with secondary intention.
Purse String (Intermediate) Text: Given the location of the defect and the characteristics of the surrounding skin a purse string intermediate closure was deemed most appropriate.  Undermining was performed circumfirentially around the surgical defect.  A purse string suture was then placed and tightened.
Bilobed Transposition Flap Text: The defect edges were debeveled with a #15 scalpel blade.  Given the location of the defect and the proximity to free margins a bilobed transposition flap was deemed most appropriate.  Using a sterile surgical marker, an appropriate bilobe flap drawn around the defect.    The area thus outlined was incised deep to adipose tissue with a #15 scalpel blade.  The skin margins were undermined to an appropriate distance in all directions utilizing iris scissors.
Mastoid Interpolation Flap Text: A decision was made to reconstruct the defect utilizing an interpolation axial flap and a staged reconstruction.  A telfa template was made of the defect.  This telfa template was then used to outline the mastoid interpolation flap.  The donor area for the pedicle flap was then injected with anesthesia.  The flap was excised through the skin and subcutaneous tissue down to the layer of the underlying musculature.  The pedicle flap was carefully excised within this deep plane to maintain its blood supply.  The edges of the donor site were undermined.   The donor site was closed in a primary fashion.  The pedicle was then rotated into position and sutured.  Once the tube was sutured into place, adequate blood supply was confirmed with blanching and refill.  The pedicle was then wrapped with xeroform gauze and dressed appropriately with a telfa and gauze bandage to ensure continued blood supply and protect the attached pedicle.
Modified Advancement Flap Text: The defect edges were debeveled with a #15 scalpel blade.  Given the location of the defect, shape of the defect and the proximity to free margins a modified advancement flap was deemed most appropriate.  Using a sterile surgical marker, an appropriate advancement flap was drawn incorporating the defect and placing the expected incisions within the relaxed skin tension lines where possible.    The area thus outlined was incised deep to adipose tissue with a #15 scalpel blade.  The skin margins were undermined to an appropriate distance in all directions utilizing iris scissors.
Inflammation Suggestive Of Cancer Camouflage Histology Text: There was a dense lymphocytic infiltrate which prevented adequate histologic evaluation of adjacent structures.
Tarsorrhaphy Text: A tarsorrhaphy was performed using Frost sutures.
Island Pedicle Flap With Canthal Suspension Text: The defect edges were debeveled with a #15 scalpel blade.  Given the location of the defect, shape of the defect and the proximity to free margins an island pedicle advancement flap was deemed most appropriate.  Using a sterile surgical marker, an appropriate advancement flap was drawn incorporating the defect, outlining the appropriate donor tissue and placing the expected incisions within the relaxed skin tension lines where possible. The area thus outlined was incised deep to adipose tissue with a #15 scalpel blade.  The skin margins were undermined to an appropriate distance in all directions around the primary defect and laterally outward around the island pedicle utilizing iris scissors.  There was minimal undermining beneath the pedicle flap. A suspension suture was placed in the canthal tendon to prevent tension and prevent ectropion.
Surgeon Performing Repair (Optional): Jurgen
Spiral Flap Text: The defect edges were debeveled with a #15 scalpel blade.  Given the location of the defect, shape of the defect and the proximity to free margins a spiral flap was deemed most appropriate.  Using a sterile surgical marker, an appropriate rotation flap was drawn incorporating the defect and placing the expected incisions within the relaxed skin tension lines where possible. The area thus outlined was incised deep to adipose tissue with a #15 scalpel blade.  The skin margins were undermined to an appropriate distance in all directions utilizing iris scissors.
Complex/Intermediate Repair Variations: Crescentic
Star Wedge Flap Text: The defect edges were debeveled with a #15 scalpel blade.  Given the location of the defect, shape of the defect and the proximity to free margins a star wedge flap was deemed most appropriate.  Using a sterile surgical marker, an appropriate rotation flap was drawn incorporating the defect and placing the expected incisions within the relaxed skin tension lines where possible. The area thus outlined was incised deep to adipose tissue with a #15 scalpel blade.  The skin margins were undermined to an appropriate distance in all directions utilizing iris scissors.
Repair Anesthesia Method: local infiltration
Ear Wedge Repair Text: A wedge excision was completed by carrying down an excision through the full thickness of the ear and cartilage with an inward facing Burow's triangle. The wound was then closed in a layered fashion.
Consent 1/Introductory Paragraph: The rationale for Mohs was explained to the patient and consent was obtained. The risks, benefits and alternatives to therapy were discussed in detail. Specifically, the risks of infection, scarring, bleeding, prolonged wound healing, incomplete removal, allergy to anesthesia, nerve injury and recurrence were addressed. Prior to the procedure, the treatment site was clearly identified and confirmed by the patient. All components of Universal Protocol/PAUSE Rule completed.
Unique Flap 3 Name: Mercedes Flap
O-T Plasty Text: The defect edges were debeveled with a #15 scalpel blade.  Given the location of the defect, shape of the defect and the proximity to free margins an O-T plasty was deemed most appropriate.  Using a sterile surgical marker, an appropriate O-T plasty was drawn incorporating the defect and placing the expected incisions within the relaxed skin tension lines where possible.    The area thus outlined was incised deep to adipose tissue with a #15 scalpel blade.  The skin margins were undermined to an appropriate distance in all directions utilizing iris scissors.
Advancement Flap (Double) Text: The defect edges were debeveled with a #15 scalpel blade.  Given the location of the defect and the proximity to free margins a double advancement flap was deemed most appropriate.  Using a sterile surgical marker, the appropriate advancement flaps were drawn incorporating the defect and placing the expected incisions within the relaxed skin tension lines where possible.    The area thus outlined was incised deep to adipose tissue with a #15 scalpel blade.  The skin margins were undermined to an appropriate distance in all directions utilizing iris scissors.
Estimated Blood Loss (Cc): less than 5 cc
Dressing (No Sutures): dry sterile dressing
Rhombic Flap Text: The defect edges were debeveled with a #15 scalpel blade.  Given the location of the defect and the proximity to free margins a rhombic flap was deemed most appropriate.  Using a sterile surgical marker, an appropriate rhombic flap was drawn incorporating the defect.    The area thus outlined was incised deep to adipose tissue with a #15 scalpel blade.  The skin margins were undermined to an appropriate distance in all directions utilizing iris scissors.
M-Plasty Complex Repair Preamble Text (Leave Blank If You Do Not Want): Extensive wide undermining was performed.
Cartilage Graft Text: The defect edges were debeveled with a #15 scalpel blade.  Given the location of the defect, shape of the defect, the fact the defect involved a full thickness cartilage defect a cartilage graft was deemed most appropriate.  An appropriate donor site was identified, cleansed, and anesthetized. The cartilage graft was then harvested and transferred to the recipient site, oriented appropriately and then sutured into place.  The secondary defect was then repaired using a primary closure.
Epidermal Closure Graft Donor Site (Optional): simple interrupted
Anesthesia Volume In Cc: 6
Consent (Marginal Mandibular)/Introductory Paragraph: The rationale for Mohs was explained to the patient and consent was obtained. The risks, benefits and alternatives to therapy were discussed in detail. Specifically, the risks of damage to the marginal mandibular branch of the facial nerve, infection, scarring, bleeding, prolonged wound healing, incomplete removal, allergy to anesthesia, and recurrence were addressed. Prior to the procedure, the treatment site was clearly identified and confirmed by the patient. All components of Universal Protocol/PAUSE Rule completed.
Mohs Case Number: [id number]
Unique Flap 3 Text: The defect edges were debeveled with a #15 scalpel blade.  Given the location of the defect, shape of the defect and the proximity to free margins a Mercedes (double advancement flap) was deemed most appropriate.  Using a sterile surgical marker, the appropriate transposition flaps were drawn incorporating the defect and placing the expected incisions within the relaxed skin tension lines where possible.    The area thus outlined was incised deep to adipose tissue with a #15 scalpel blade.  The skin margins were undermined to an appropriate distance in all directions utilizing iris scissors.  Hemostasis was achieved with electrocautery.  The flaps were then advanced into the defect and anchored with interrupted buried subcutaneous sutures.
V-Y Plasty Text: The defect edges were debeveled with a #15 scalpel blade.  Given the location of the defect, shape of the defect and the proximity to free margins an V-Y advancement flap was deemed most appropriate.  Using a sterile surgical marker, an appropriate advancement flap was drawn incorporating the defect and placing the expected incisions within the relaxed skin tension lines where possible.    The area thus outlined was incised deep to adipose tissue with a #15 scalpel blade.  The skin margins were undermined to an appropriate distance in all directions utilizing iris scissors.
A-T Advancement Flap Text: The defect edges were debeveled with a #15 scalpel blade.  Given the location of the defect, shape of the defect and the proximity to free margins an A-T advancement flap was deemed most appropriate.  Using a sterile surgical marker, an appropriate advancement flap was drawn incorporating the defect and placing the expected incisions within the relaxed skin tension lines where possible.    The area thus outlined was incised deep to adipose tissue with a #15 scalpel blade.  The skin margins were undermined to an appropriate distance in all directions utilizing iris scissors.
Skin Substitute Text: The defect edges were debeveled with a #15 scalpel blade.  Given the location of the defect, shape of the defect and the proximity to free margins a skin substitute graft was deemed most appropriate.  The graft material was trimmed to fit the size of the defect. The graft was then placed in the primary defect and oriented appropriately.
Bilateral Helical Rim Advancement Flap Text: The defect edges were debeveled with a #15 blade scalpel.  Given the location of the defect and the proximity to free margins (helical rim) a bilateral helical rim advancement flap was deemed most appropriate.  Using a sterile surgical marker, the appropriate advancement flaps were drawn incorporating the defect and placing the expected incisions between the helical rim and antihelix where possible.  The area thus outlined was incised through and through with a #15 scalpel blade.  With a skin hook and iris scissors, the flaps were gently and sharply undermined and freed up.
Consent (Nose)/Introductory Paragraph: The rationale for Mohs was explained to the patient and consent was obtained. The risks, benefits and alternatives to therapy were discussed in detail. Specifically, the risks of nasal deformity, changes in the flow of air through the nose, infection, scarring, bleeding, prolonged wound healing, incomplete removal, allergy to anesthesia, nerve injury and recurrence were addressed. Prior to the procedure, the treatment site was clearly identified and confirmed by the patient. All components of Universal Protocol/PAUSE Rule completed.
Tissue Cultured Epidermal Autograft Text: The defect edges were debeveled with a #15 scalpel blade.  Given the location of the defect, shape of the defect and the proximity to free margins a tissue cultured epidermal autograft was deemed most appropriate.  The graft was then trimmed to fit the size of the defect.  The graft was then placed in the primary defect and oriented appropriately.
Ear Star Wedge Flap Text: The defect edges were debeveled with a #15 blade scalpel.  Given the location of the defect and the proximity to free margins (helical rim) an ear star wedge flap was deemed most appropriate.  Using a sterile surgical marker, the appropriate flap was drawn incorporating the defect and placing the expected incisions between the helical rim and antihelix where possible.  The area thus outlined was incised through and through with a #15 scalpel blade.
Closure 2 Information: This tab is for additional flaps and grafts, including complex repair and grafts and complex repair and flaps. You can also specify a different location for the additional defect, if the location is the same you do not need to select a new one. We will insert the automated text for the repair you select below just as we do for solitary flaps and grafts. Please note that at this time if you select a location with a different insurance zone you will need to override the ICD10 and CPT if appropriate.
Consent (Lip)/Introductory Paragraph: The rationale for Mohs was explained to the patient and consent was obtained. The risks, benefits and alternatives to therapy were discussed in detail. Specifically, the risks of lip deformity, changes in the oral aperture, infection, scarring, bleeding, prolonged wound healing, incomplete removal, allergy to anesthesia, nerve injury and recurrence were addressed. Prior to the procedure, the treatment site was clearly identified and confirmed by the patient. All components of Universal Protocol/PAUSE Rule completed.
Referring Physician (Optional): Dr Belle
Post-Care Instructions: I reviewed with the patient in detail post-care instructions. Patient is not to engage in any heavy lifting, exercise, or swimming for the next 14 days. Should the patient develop any fevers, chills, bleeding, severe pain patient will contact the office immediately.
Cheek-To-Nose Interpolation Flap Text: A decision was made to reconstruct the defect utilizing an interpolation axial flap and a staged reconstruction.  A telfa template was made of the defect.  This telfa template was then used to outline the Cheek-To-Nose Interpolation flap.  The donor area for the pedicle flap was then injected with anesthesia.  The flap was excised through the skin and subcutaneous tissue down to the layer of the underlying musculature.  The interpolation flap was carefully excised within this deep plane to maintain its blood supply.  The edges of the donor site were undermined.   The donor site was closed in a primary fashion.  The pedicle was then rotated into position and sutured.  Once the tube was sutured into place, adequate blood supply was confirmed with blanching and refill.  The pedicle was then wrapped with xeroform gauze and dressed appropriately with a telfa and gauze bandage to ensure continued blood supply and protect the attached pedicle.
Eye Protection Verbiage: Before proceeding with the stage, a plastic scleral shield was inserted. The globe was anesthetized with a few drops of 1% lidocaine with 1:100,000 epinephrine. Then, an appropriate sized scleral shield was chosen and coated with lacrilube ointment. The shield was gently inserted and left in place for the duration of each stage. After the stage was completed, the shield was gently removed.
Suturegard Body: The suture ends were repeatedly re-tightened and re-clamped to achieve the desired tissue expansion.
V-Y Flap Text: The defect edges were debeveled with a #15 scalpel blade.  Given the location of the defect, shape of the defect and the proximity to free margins a V-Y flap was deemed most appropriate.  Using a sterile surgical marker, an appropriate advancement flap was drawn incorporating the defect and placing the expected incisions within the relaxed skin tension lines where possible.    The area thus outlined was incised deep to adipose tissue with a #15 scalpel blade.  The skin margins were undermined to an appropriate distance in all directions utilizing iris scissors.
No Repair - Repaired With Adjacent Surgical Defect Text (Leave Blank If You Do Not Want): After obtaining clear surgical margins the defect was repaired concurrently with another surgical defect which was in close approximation.
Partial Purse String (Simple) Text: Given the location of the defect and the characteristics of the surrounding skin a simple purse string closure was deemed most appropriate.  Undermining was performed circumfirentially around the surgical defect.  A purse string suture was then placed and tightened. Wound tension only allowed a partial closure of the circular defect.
Trilobed Flap Text: The defect edges were debeveled with a #15 scalpel blade.  Given the location of the defect and the proximity to free margins a trilobed flap was deemed most appropriate.  Using a sterile surgical marker, an appropriate trilobed flap drawn around the defect.    The area thus outlined was incised deep to adipose tissue with a #15 scalpel blade.  The skin margins were undermined to an appropriate distance in all directions utilizing iris scissors.
Posterior Auricular Interpolation Flap Text: A decision was made to reconstruct the defect utilizing an interpolation axial flap and a staged reconstruction.  A telfa template was made of the defect.  This telfa template was then used to outline the posterior auricular interpolation flap.  The donor area for the pedicle flap was then injected with anesthesia.  The flap was excised through the skin and subcutaneous tissue down to the layer of the underlying musculature.  The pedicle flap was carefully excised within this deep plane to maintain its blood supply.  The edges of the donor site were undermined.   The donor site was closed in a primary fashion.  The pedicle was then rotated into position and sutured.  Once the tube was sutured into place, adequate blood supply was confirmed with blanching and refill.  The pedicle was then wrapped with xeroform gauze and dressed appropriately with a telfa and gauze bandage to ensure continued blood supply and protect the attached pedicle.
Unna Boot Text: An Unna boot was placed to help immobilize the limb and facilitate more rapid healing.
Mucosal Advancement Flap Text: Given the location of the defect, shape of the defect and the proximity to free margins a mucosal advancement flap was deemed most appropriate. Incisions were made with a 15 blade scalpel in the appropriate fashion along the cutaneous vermilion border and the mucosal lip. The remaining actinically damaged mucosal tissue was excised.  The mucosal advancement flap was then elevated to the gingival sulcus with care taken to preserve the neurovascular structures and advanced into the primary defect. Care was taken to ensure that precise realignment of the vermilion border was achieved.
Graft Basting Suture (Optional): 5-0 Fast Absorbing Gut
Complex Repair And Flap Additional Text (Will Appearing After The Standard Complex Repair Text): The complex repair was not sufficient to completely close the primary defect. The remaining additional defect was repaired with the flap mentioned below.
Alar Island Pedicle Flap Text: The defect edges were debeveled with a #15 scalpel blade.  Given the location of the defect, shape of the defect and the proximity to the alar rim an island pedicle advancement flap was deemed most appropriate.  Using a sterile surgical marker, an appropriate advancement flap was drawn incorporating the defect, outlining the appropriate donor tissue and placing the expected incisions within the nasal ala running parallel to the alar rim. The area thus outlined was incised with a #15 scalpel blade.  The skin margins were undermined minimally to an appropriate distance in all directions around the primary defect and laterally outward around the island pedicle utilizing iris scissors.  There was minimal undermining beneath the pedicle flap.
Epidermal Sutures: 5-0 Ethilon
Initial Size Of Lesion: 0.3
Postop Diagnosis: same
Transposition Flap Text: The defect edges were debeveled with a #15 scalpel blade.  Given the location of the defect and the proximity to free margins a transposition flap was deemed most appropriate.  Using a sterile surgical marker, an appropriate transposition flap was drawn incorporating the defect.    The area thus outlined was incised deep to adipose tissue with a #15 scalpel blade.  The skin margins were undermined to an appropriate distance in all directions utilizing iris scissors.
Bcc Histology Text: There were numerous aggregates of basaloid cells.
Full Thickness Lip Wedge Repair (Flap) Text: Given the location of the defect and the proximity to free margins a full thickness wedge repair was deemed most appropriate.  Using a sterile surgical marker, the appropriate repair was drawn incorporating the defect and placing the expected incisions perpendicular to the vermilion border.  The vermilion border was also meticulously outlined to ensure appropriate reapproximation during the repair.  The area thus outlined was incised through and through with a #15 scalpel blade.  The muscularis and dermis were reaproximated with deep sutures following hemostasis. Care was taken to realign the vermilion border before proceeding with the superficial closure.  Once the vermilion was realigned the superfical and mucosal closure was finished.
Consent 2/Introductory Paragraph: Mohs surgery was explained to the patient and consent was obtained. The risks, benefits and alternatives to therapy were discussed in detail. Specifically, the risks of infection, scarring, bleeding, prolonged wound healing, incomplete removal, allergy to anesthesia, nerve injury and recurrence were addressed. Prior to the procedure, the treatment site was clearly identified and confirmed by the patient. All components of Universal Protocol/PAUSE Rule completed.
Length To Time In Minutes Device Was In Place: 10
Unique Flap 4 Name: Banner Flap
O-Z Plasty Text: The defect edges were debeveled with a #15 scalpel blade.  Given the location of the defect, shape of the defect and the proximity to free margins an O-Z plasty (double transposition flap) was deemed most appropriate.  Using a sterile surgical marker, the appropriate transposition flaps were drawn incorporating the defect and placing the expected incisions within the relaxed skin tension lines where possible.    The area thus outlined was incised deep to adipose tissue with a #15 scalpel blade.  The skin margins were undermined to an appropriate distance in all directions utilizing iris scissors.  Hemostasis was achieved with electrocautery.  The flaps were then transposed into place, one clockwise and the other counterclockwise, and anchored with interrupted buried subcutaneous sutures.
Burow's Advancement Flap Text: The defect edges were debeveled with a #15 scalpel blade.  Given the location of the defect and the proximity to free margins a Burow's advancement flap was deemed most appropriate.  Using a sterile surgical marker, the appropriate advancement flap was drawn incorporating the defect and placing the expected incisions within the relaxed skin tension lines where possible.    The area thus outlined was incised deep to adipose tissue with a #15 scalpel blade.  The skin margins were undermined to an appropriate distance in all directions utilizing iris scissors.
Repair Type: Complex Repair
Rhomboid Transposition Flap Text: The defect edges were debeveled with a #15 scalpel blade.  Given the location of the defect and the proximity to free margins a rhomboid transposition flap was deemed most appropriate.  Using a sterile surgical marker, an appropriate rhomboid flap was drawn incorporating the defect.    The area thus outlined was incised deep to adipose tissue with a #15 scalpel blade.  The skin margins were undermined to an appropriate distance in all directions utilizing iris scissors.
Subsequent Stages Histo Method Verbiage: Using a similar technique to that described above, a thin layer of tissue was removed from all areas where tumor was visible on the previous stage.  The tissue was again oriented, mapped, dyed, and processed as above.
Composite Graft Text: The defect edges were debeveled with a #15 scalpel blade.  Given the location of the defect, shape of the defect, the proximity to free margins and the fact the defect was full thickness a composite graft was deemed most appropriate.  The defect was outline and then transferred to the donor site.  A full thickness graft was then excised from the donor site. The graft was then placed in the primary defect, oriented appropriately and then sutured into place.  The secondary defect was then repaired using a primary closure.
Consent (Spinal Accessory)/Introductory Paragraph: The rationale for Mohs was explained to the patient and consent was obtained. The risks, benefits and alternatives to therapy were discussed in detail. Specifically, the risks of damage to the spinal accessory nerve, infection, scarring, bleeding, prolonged wound healing, incomplete removal, allergy to anesthesia, and recurrence were addressed. Prior to the procedure, the treatment site was clearly identified and confirmed by the patient. All components of Universal Protocol/PAUSE Rule completed.
Suturegard Retention Suture: 2-0 Nylon
Unique Flap 4 Text: The defect edges were debeveled with a #15 scalpel blade.  Given the location of the defect and the proximity to free margins a Banner transposition flap was deemed most appropriate.  Using a sterile surgical marker, an appropriate Banner transposition flap was drawn incorporating the defect.    The area thus outlined was incised deep to adipose tissue with a #15 scalpel blade.  The skin margins were undermined to an appropriate distance in all directions utilizing iris scissors.
H Plasty Text: Given the location of the defect, shape of the defect and the proximity to free margins a H-plasty was deemed most appropriate for repair.  Using a sterile surgical marker, the appropriate advancement arms of the H-plasty were drawn incorporating the defect and placing the expected incisions within the relaxed skin tension lines where possible. The area thus outlined was incised deep to adipose tissue with a #15 scalpel blade. The skin margins were undermined to an appropriate distance in all directions utilizing iris scissors.  The opposing advancement arms were then advanced into place in opposite direction and anchored with interrupted buried subcutaneous sutures.
O-T Advancement Flap Text: The defect edges were debeveled with a #15 scalpel blade.  Given the location of the defect, shape of the defect and the proximity to free margins an O-T advancement flap was deemed most appropriate.  Using a sterile surgical marker, an appropriate advancement flap was drawn incorporating the defect and placing the expected incisions within the relaxed skin tension lines where possible.    The area thus outlined was incised deep to adipose tissue with a #15 scalpel blade.  The skin margins were undermined to an appropriate distance in all directions utilizing iris scissors.
Mohs Method Verbiage: An incision at a 45 degree angle following the standard Mohs approach was done and the specimen was harvested as a microscopic controlled layer.
Non-Graft Cartilage Fenestration Text: The cartilage was fenestrated with a 2mm punch biopsy to help facilitate healing.
Xenograft Text: The defect edges were debeveled with a #15 scalpel blade.  Given the location of the defect, shape of the defect and the proximity to free margins a xenograft was deemed most appropriate.  The graft was then trimmed to fit the size of the defect.  The graft was then placed in the primary defect and oriented appropriately.
Banner Transposition Flap Text: The defect edges were debeveled with a #15 scalpel blade.  Given the location of the defect and the proximity to free margins a Banner transposition flap was deemed most appropriate.  Using a sterile surgical marker, an appropriate flap drawn around the defect. The area thus outlined was incised deep to adipose tissue with a #15 scalpel blade.  The skin margins were undermined to an appropriate distance in all directions utilizing iris scissors.
Consent (Scalp)/Introductory Paragraph: The rationale for Mohs was explained to the patient and consent was obtained. The risks, benefits and alternatives to therapy were discussed in detail. Specifically, the risks of changes in hair growth pattern secondary to repair, infection, scarring, bleeding, prolonged wound healing, incomplete removal, allergy to anesthesia, nerve injury and recurrence were addressed. Prior to the procedure, the treatment site was clearly identified and confirmed by the patient. All components of Universal Protocol/PAUSE Rule completed.
Consent Type: Consent 1 (Standard)
Interpolation Flap Text: A decision was made to reconstruct the defect utilizing an interpolation axial flap and a staged reconstruction.  A telfa template was made of the defect.  This telfa template was then used to outline the interpolation flap.  The donor area for the pedicle flap was then injected with anesthesia.  The flap was excised through the skin and subcutaneous tissue down to the layer of the underlying musculature.  The interpolation flap was carefully excised within this deep plane to maintain its blood supply.  The edges of the donor site were undermined.   The donor site was closed in a primary fashion.  The pedicle was then rotated into position and sutured.  Once the tube was sutured into place, adequate blood supply was confirmed with blanching and refill.  The pedicle was then wrapped with xeroform gauze and dressed appropriately with a telfa and gauze bandage to ensure continued blood supply and protect the attached pedicle.
Advancement-Rotation Flap Text: The defect edges were debeveled with a #15 scalpel blade.  Given the location of the defect, shape of the defect and the proximity to free margins an advancement-rotation flap was deemed most appropriate.  Using a sterile surgical marker, an appropriate flap was drawn incorporating the defect and placing the expected incisions within the relaxed skin tension lines where possible. The area thus outlined was incised deep to adipose tissue with a #15 scalpel blade.  The skin margins were undermined to an appropriate distance in all directions utilizing iris scissors.
Same Histology In Subsequent Stages Text: The pattern and morphology of the tumor is as described in the first stage.
Donor Site Anesthesia Type: same as repair anesthesia
Home Suture Removal Text: Patient was provided instructions on removing sutures and will remove their sutures at home.  If they have any questions or difficulties they will call the office.
Partial Purse String (Intermediate) Text: Given the location of the defect and the characteristics of the surrounding skin an intermediate purse string closure was deemed most appropriate.  Undermining was performed circumfirentially around the surgical defect.  A purse string suture was then placed and tightened. Wound tension only allowed a partial closure of the circular defect.
Dorsal Nasal Flap Text: The defect edges were debeveled with a #15 scalpel blade.  Given the location of the defect and the proximity to free margins a dorsal nasal flap,based upon the glabellar folds, was deemed most appropriate.  Using a sterile surgical marker, an appropriate dorsal nasal flap was drawn around the defect.    The area thus outlined was incised deep to adipose tissue with a #15 scalpel blade.  The skin margins were undermined to an appropriate distance in all directions utilizing iris scissors.
Paramedian Forehead Flap Text: A decision was made to reconstruct the defect utilizing an interpolation axial flap and a staged reconstruction.  A telfa template was made of the defect.  This telfa template was then used to outline the paramedian forehead pedicle flap.  The donor area for the pedicle flap was then injected with anesthesia.  The flap was excised through the skin and subcutaneous tissue down to the layer of the underlying musculature.  The pedicle flap was carefully excised within this deep plane to maintain its blood supply.  The edges of the donor site were undermined.   The donor site was closed in a primary fashion.  The pedicle was then rotated into position and sutured.  Once the tube was sutured into place, adequate blood supply was confirmed with blanching and refill.  The pedicle was then wrapped with xeroform gauze and dressed appropriately with a telfa and gauze bandage to ensure continued blood supply and protect the attached pedicle.
Hatchet Flap Text: The defect edges were debeveled with a #15 scalpel blade.  Given the location of the defect, shape of the defect and the proximity to free margins a hatchet flap based from the glabella was deemed most appropriate.  Using a sterile surgical marker, an appropriate glabellar hatchet flap was drawn incorporating the defect and placing the expected incisions within the relaxed skin tension lines where possible.    The area thus outlined was incised deep to adipose tissue with a #15 scalpel blade.  The skin margins were undermined to an appropriate distance in all directions utilizing iris scissors.
Area H Indication Text: Tumors in this location are included in Area H (eyelids, eyebrows, nose, lips, chin, ear, pre-auricular, post-auricular, temple, genitalia, hands, feet, ankles and areola).  Tissue conservation is critical in these anatomic locations.
Complex Repair And Graft Additional Text (Will Appearing After The Standard Complex Repair Text): The complex repair was not sufficient to completely close the primary defect. The remaining additional defect was repaired with the graft mentioned below.
Double Island Pedicle Flap Text: The defect edges were debeveled with a #15 scalpel blade.  Given the location of the defect, shape of the defect and the proximity to free margins a double island pedicle advancement flap was deemed most appropriate.  Using a sterile surgical marker, an appropriate advancement flap was drawn incorporating the defect, outlining the appropriate donor tissue and placing the expected incisions within the relaxed skin tension lines where possible.    The area thus outlined was incised deep to adipose tissue with a #15 scalpel blade.  The skin margins were undermined to an appropriate distance in all directions around the primary defect and laterally outward around the island pedicle utilizing iris scissors.  There was minimal undermining beneath the pedicle flap.

## 2019-07-12 ENCOUNTER — APPOINTMENT (RX ONLY)
Dept: URBAN - METROPOLITAN AREA CLINIC 35 | Facility: CLINIC | Age: 64
Setting detail: DERMATOLOGY
End: 2019-07-12

## 2019-07-12 DIAGNOSIS — L82.1 OTHER SEBORRHEIC KERATOSIS: ICD-10-CM

## 2019-07-12 PROCEDURE — ? COUNSELING

## 2019-07-12 PROCEDURE — 99212 OFFICE O/P EST SF 10 MIN: CPT | Mod: 24

## 2019-07-12 ASSESSMENT — LOCATION SIMPLE DESCRIPTION DERM: LOCATION SIMPLE: CHEST

## 2019-07-12 ASSESSMENT — LOCATION ZONE DERM: LOCATION ZONE: TRUNK

## 2019-07-12 ASSESSMENT — LOCATION DETAILED DESCRIPTION DERM: LOCATION DETAILED: LEFT MEDIAL SUPERIOR CHEST

## 2019-07-16 ENCOUNTER — APPOINTMENT (RX ONLY)
Dept: URBAN - METROPOLITAN AREA CLINIC 36 | Facility: CLINIC | Age: 64
Setting detail: DERMATOLOGY
End: 2019-07-16

## 2019-07-16 DIAGNOSIS — Z48.02 ENCOUNTER FOR REMOVAL OF SUTURES: ICD-10-CM

## 2019-07-16 PROCEDURE — 99024 POSTOP FOLLOW-UP VISIT: CPT

## 2019-07-16 PROCEDURE — ? SUTURE REMOVAL (GLOBAL PERIOD)

## 2019-07-16 ASSESSMENT — LOCATION DETAILED DESCRIPTION DERM: LOCATION DETAILED: RIGHT SUPERIOR FOREHEAD

## 2019-07-16 ASSESSMENT — LOCATION SIMPLE DESCRIPTION DERM: LOCATION SIMPLE: RIGHT FOREHEAD

## 2019-07-16 ASSESSMENT — LOCATION ZONE DERM: LOCATION ZONE: FACE

## 2019-07-16 NOTE — PROCEDURE: SUTURE REMOVAL (GLOBAL PERIOD)
Add 82695 Cpt? (Important Note: In 2017 The Use Of 67841 Is Being Tracked By Cms To Determine Future Global Period Reimbursement For Global Periods): yes
Detail Level: Detailed

## 2019-08-30 NOTE — TELEPHONE ENCOUNTER
Was the patient seen in the last year in this department? Yes  lov 5/9/19  Does patient have an active prescription for medications requested? No     Received Request Via: Pharmacy

## 2019-10-12 ENCOUNTER — HOSPITAL ENCOUNTER (OUTPATIENT)
Dept: LAB | Facility: MEDICAL CENTER | Age: 64
End: 2019-10-12
Attending: NURSE PRACTITIONER
Payer: COMMERCIAL

## 2019-10-12 DIAGNOSIS — I10 ESSENTIAL HYPERTENSION: ICD-10-CM

## 2019-10-12 DIAGNOSIS — E11.9 TYPE 2 DIABETES MELLITUS WITHOUT COMPLICATION, WITHOUT LONG-TERM CURRENT USE OF INSULIN (HCC): ICD-10-CM

## 2019-10-12 DIAGNOSIS — E78.5 DYSLIPIDEMIA: ICD-10-CM

## 2019-10-12 LAB
ALBUMIN SERPL BCP-MCNC: 4.7 G/DL (ref 3.2–4.9)
ALBUMIN/GLOB SERPL: 1.7 G/DL
ALP SERPL-CCNC: 84 U/L (ref 30–99)
ALT SERPL-CCNC: 20 U/L (ref 2–50)
ANION GAP SERPL CALC-SCNC: 9 MMOL/L (ref 0–11.9)
AST SERPL-CCNC: 17 U/L (ref 12–45)
BASOPHILS # BLD AUTO: 1.4 % (ref 0–1.8)
BASOPHILS # BLD: 0.09 K/UL (ref 0–0.12)
BILIRUB SERPL-MCNC: 0.5 MG/DL (ref 0.1–1.5)
BUN SERPL-MCNC: 14 MG/DL (ref 8–22)
CALCIUM SERPL-MCNC: 9.4 MG/DL (ref 8.5–10.5)
CHLORIDE SERPL-SCNC: 107 MMOL/L (ref 96–112)
CHOLEST SERPL-MCNC: 190 MG/DL (ref 100–199)
CO2 SERPL-SCNC: 27 MMOL/L (ref 20–33)
CREAT SERPL-MCNC: 0.72 MG/DL (ref 0.5–1.4)
CREAT UR-MCNC: 108.9 MG/DL
EOSINOPHIL # BLD AUTO: 0.14 K/UL (ref 0–0.51)
EOSINOPHIL NFR BLD: 2.2 % (ref 0–6.9)
ERYTHROCYTE [DISTWIDTH] IN BLOOD BY AUTOMATED COUNT: 45.8 FL (ref 35.9–50)
EST. AVERAGE GLUCOSE BLD GHB EST-MCNC: 131 MG/DL
FASTING STATUS PATIENT QL REPORTED: NORMAL
GLOBULIN SER CALC-MCNC: 2.8 G/DL (ref 1.9–3.5)
GLUCOSE SERPL-MCNC: 108 MG/DL (ref 65–99)
HBA1C MFR BLD: 6.2 % (ref 0–5.6)
HCT VFR BLD AUTO: 43.8 % (ref 37–47)
HDLC SERPL-MCNC: 58 MG/DL
HGB BLD-MCNC: 14.6 G/DL (ref 12–16)
IMM GRANULOCYTES # BLD AUTO: 0.01 K/UL (ref 0–0.11)
IMM GRANULOCYTES NFR BLD AUTO: 0.2 % (ref 0–0.9)
LDLC SERPL CALC-MCNC: 94 MG/DL
LYMPHOCYTES # BLD AUTO: 2.39 K/UL (ref 1–4.8)
LYMPHOCYTES NFR BLD: 37.8 % (ref 22–41)
MCH RBC QN AUTO: 32.6 PG (ref 27–33)
MCHC RBC AUTO-ENTMCNC: 33.3 G/DL (ref 33.6–35)
MCV RBC AUTO: 97.8 FL (ref 81.4–97.8)
MICROALBUMIN UR-MCNC: 0.8 MG/DL
MICROALBUMIN/CREAT UR: 7 MG/G (ref 0–30)
MONOCYTES # BLD AUTO: 0.41 K/UL (ref 0–0.85)
MONOCYTES NFR BLD AUTO: 6.5 % (ref 0–13.4)
NEUTROPHILS # BLD AUTO: 3.28 K/UL (ref 2–7.15)
NEUTROPHILS NFR BLD: 51.9 % (ref 44–72)
NRBC # BLD AUTO: 0 K/UL
NRBC BLD-RTO: 0 /100 WBC
PLATELET # BLD AUTO: 295 K/UL (ref 164–446)
PMV BLD AUTO: 10.3 FL (ref 9–12.9)
POTASSIUM SERPL-SCNC: 4.1 MMOL/L (ref 3.6–5.5)
PROT SERPL-MCNC: 7.5 G/DL (ref 6–8.2)
RBC # BLD AUTO: 4.48 M/UL (ref 4.2–5.4)
SODIUM SERPL-SCNC: 143 MMOL/L (ref 135–145)
TRIGL SERPL-MCNC: 190 MG/DL (ref 0–149)
WBC # BLD AUTO: 6.3 K/UL (ref 4.8–10.8)

## 2019-10-12 PROCEDURE — 80061 LIPID PANEL: CPT

## 2019-10-12 PROCEDURE — 82570 ASSAY OF URINE CREATININE: CPT

## 2019-10-12 PROCEDURE — 80053 COMPREHEN METABOLIC PANEL: CPT

## 2019-10-12 PROCEDURE — 82043 UR ALBUMIN QUANTITATIVE: CPT

## 2019-10-12 PROCEDURE — 85025 COMPLETE CBC W/AUTO DIFF WBC: CPT

## 2019-10-12 PROCEDURE — 36415 COLL VENOUS BLD VENIPUNCTURE: CPT

## 2019-10-12 PROCEDURE — 83036 HEMOGLOBIN GLYCOSYLATED A1C: CPT

## 2019-10-16 ENCOUNTER — APPOINTMENT (RX ONLY)
Dept: URBAN - METROPOLITAN AREA CLINIC 35 | Facility: CLINIC | Age: 64
Setting detail: DERMATOLOGY
End: 2019-10-16

## 2019-10-16 DIAGNOSIS — D485 NEOPLASM OF UNCERTAIN BEHAVIOR OF SKIN: ICD-10-CM

## 2019-10-16 DIAGNOSIS — L82.1 OTHER SEBORRHEIC KERATOSIS: ICD-10-CM

## 2019-10-16 PROBLEM — D48.5 NEOPLASM OF UNCERTAIN BEHAVIOR OF SKIN: Status: ACTIVE | Noted: 2019-10-16

## 2019-10-16 PROCEDURE — ? BIOPSY BY SHAVE METHOD

## 2019-10-16 PROCEDURE — 99212 OFFICE O/P EST SF 10 MIN: CPT | Mod: 25

## 2019-10-16 PROCEDURE — ? COUNSELING

## 2019-10-16 PROCEDURE — 11102 TANGNTL BX SKIN SINGLE LES: CPT

## 2019-10-16 ASSESSMENT — LOCATION SIMPLE DESCRIPTION DERM
LOCATION SIMPLE: LEFT FOREHEAD
LOCATION SIMPLE: RIGHT HAND

## 2019-10-16 ASSESSMENT — LOCATION ZONE DERM
LOCATION ZONE: FACE
LOCATION ZONE: HAND

## 2019-10-16 ASSESSMENT — LOCATION DETAILED DESCRIPTION DERM
LOCATION DETAILED: RIGHT RADIAL DORSAL HAND
LOCATION DETAILED: LEFT SUPERIOR FOREHEAD

## 2019-10-16 NOTE — PROCEDURE: BIOPSY BY SHAVE METHOD
Curettage Text: The wound bed was treated with curettage after the biopsy was performed.
Hemostasis: Aluminum Chloride
Depth Of Biopsy: dermis
Size Of Lesion In Cm: 1
Lab: [id number]
Billing Type: Third-Party Bill
X Size Of Lesion In Cm: 0
Lab Facility: 
Cryotherapy Text: The wound bed was treated with cryotherapy after the biopsy was performed.
Post-Care Instructions: I reviewed with the patient in detail post-care instructions. Keep the biopsy site dry overnight, and then change the dressing once daily and apply a thin layer of petrolatum with a clean q-tip. \\nShowers are OK after 24 hours.  Allow clean water to run over the area.  Do not touch the biopsy site with your hands.  Do not immerse the biopsy site in water such as going into a swimming pool or bathtub until the sutures are removed.  If the biopsy site gets more painful with time, red, or drains, this is concerning for infection.  If you have signs of infection please call the office to come in for a walk in visit Monday through Friday 8:30 am to 12 pm or 1 pm to 4:30 pm.  If we are not in the office, please call through the answering service.
Anesthesia Type: 0.5% lidocaine with 1:200,000 epinephrine and a 1:10 solution of 8.4% sodium bicarbonate
Destruction After The Procedure: No
Wound Care: Petrolatum
Biopsy Type: H and E
Consent: Written consent was obtained and risks were reviewed including but not limited to scarring, infection, bleeding, scabbing, incomplete removal, nerve damage and allergy to anesthesia.
Electrodesiccation Text: The wound bed was treated with electrodesiccation after the biopsy was performed.
Notification Instructions: Patient will be notified of biopsy results. However, patient instructed to call the office if not contacted within 2 weeks.
Electrodesiccation And Curettage Text: The wound bed was treated with electrodesiccation and curettage after the biopsy was performed.
Anesthesia Volume In Cc: 0.5
Type Of Destruction Used: Curettage
Was A Bandage Applied: Yes
Dressing: bandage
Detail Level: Detailed
Biopsy Method: double edge Personna blade
Silver Nitrate Text: The wound bed was treated with silver nitrate after the biopsy was performed.

## 2019-11-14 ENCOUNTER — OFFICE VISIT (OUTPATIENT)
Dept: MEDICAL GROUP | Facility: LAB | Age: 64
End: 2019-11-14
Payer: COMMERCIAL

## 2019-11-14 VITALS
HEIGHT: 66 IN | OXYGEN SATURATION: 92 % | WEIGHT: 201 LBS | HEART RATE: 86 BPM | DIASTOLIC BLOOD PRESSURE: 80 MMHG | TEMPERATURE: 98.2 F | BODY MASS INDEX: 32.3 KG/M2 | RESPIRATION RATE: 14 BRPM | SYSTOLIC BLOOD PRESSURE: 136 MMHG

## 2019-11-14 DIAGNOSIS — E11.9 TYPE 2 DIABETES MELLITUS WITHOUT COMPLICATION, WITHOUT LONG-TERM CURRENT USE OF INSULIN (HCC): ICD-10-CM

## 2019-11-14 DIAGNOSIS — E78.5 DYSLIPIDEMIA: ICD-10-CM

## 2019-11-14 DIAGNOSIS — Z85.89 HISTORY OF SQUAMOUS CELL CARCINOMA: ICD-10-CM

## 2019-11-14 DIAGNOSIS — I10 ESSENTIAL HYPERTENSION: ICD-10-CM

## 2019-11-14 DIAGNOSIS — E55.9 VITAMIN D DEFICIENCY: ICD-10-CM

## 2019-11-14 DIAGNOSIS — Z23 NEED FOR VACCINATION: ICD-10-CM

## 2019-11-14 PROCEDURE — 99214 OFFICE O/P EST MOD 30 MIN: CPT | Mod: 25 | Performed by: NURSE PRACTITIONER

## 2019-11-14 PROCEDURE — 90686 IIV4 VACC NO PRSV 0.5 ML IM: CPT | Performed by: NURSE PRACTITIONER

## 2019-11-14 PROCEDURE — 90471 IMMUNIZATION ADMIN: CPT | Performed by: NURSE PRACTITIONER

## 2019-11-14 RX ORDER — FLUTICASONE PROPIONATE 50 MCG
1 SPRAY, SUSPENSION (ML) NASAL DAILY
COMMUNITY

## 2019-11-14 RX ORDER — ATORVASTATIN CALCIUM 10 MG/1
10 TABLET, FILM COATED ORAL DAILY
Qty: 30 TAB | Refills: 10 | Status: SHIPPED | OUTPATIENT
Start: 2019-11-14

## 2019-11-14 RX ORDER — MONTELUKAST SODIUM 10 MG/1
10 TABLET ORAL
Refills: 4 | COMMUNITY
Start: 2019-08-30

## 2019-11-14 NOTE — ASSESSMENT & PLAN NOTE
Chronic stable.  Last A1c: 6.2  Medications: Metformin 500 mg twice daily  ACE: Lisinopril 20 mg daily  Statin: Atorvastatin 10 mg daily  ASA: 81 mg daily  Diabetic diet: Yes.  She does like sweets.  We have counseled her today on reducing her processed food and sweet intake.  Patient verbalized understanding  Exercise: She is not exercising regularly  Checks feet: yes, no sores  Eye exam: August 2019 at [facility].  Kidney function: 10/2019.  Denies polyuria, polydipsia, numbness or tingling in extremities

## 2019-11-14 NOTE — ASSESSMENT & PLAN NOTE
Chronic stable. Doing well on atorvastatin. No side effects. Continue same.  Patient and I then discussed necessary dietary changes to make to address dyslipidemia.  Patient verbalized understanding.    ROS is NEGATIVE for dizziness, generalized weakness/fatigue, vision/hearing changes, jaw pain/paresthesias, BUE pain/paresthesias/numbness/weakness, chest pain/pressure, palpitations, dyspnea, RUQ abdominal pain, oliguria/anuria, BLE edema.    Lab Results   Component Value Date/Time    CHOLSTRLTOT 190 10/12/2019 07:12 AM    LDL 94 10/12/2019 07:12 AM    HDL 58 10/12/2019 07:12 AM    TRIGLYCERIDE 190 (H) 10/12/2019 07:12 AM

## 2019-11-14 NOTE — PROGRESS NOTES
CC:Diagnoses of Need for vaccination, Dyslipidemia, Essential hypertension, Type 2 diabetes mellitus without complication, without long-term current use of insulin (HCC), and History of squamous cell carcinoma were pertinent to this visit.    HISTORY OF PRESENT ILLNESS: Alisa Vasquez is a 64 y.o. female established patient who presents today to discuss the following problems:    Dyslipidemia  Chronic stable. Doing well on atorvastatin. No side effects. Continue same.  Patient and I then discussed necessary dietary changes to make to address dyslipidemia.  Patient verbalized understanding.    ROS is NEGATIVE for dizziness, generalized weakness/fatigue, vision/hearing changes, jaw pain/paresthesias, BUE pain/paresthesias/numbness/weakness, chest pain/pressure, palpitations, dyspnea, RUQ abdominal pain, oliguria/anuria, BLE edema.    Lab Results   Component Value Date/Time    CHOLSTRLTOT 190 10/12/2019 07:12 AM    LDL 94 10/12/2019 07:12 AM    HDL 58 10/12/2019 07:12 AM    TRIGLYCERIDE 190 (H) 10/12/2019 07:12 AM         Essential hypertension  This is chronic. Stable.  She has not monitoring her blood pressures at home.  Currently taking lisinopril as directed. Also taking baby aspirin. Denies lightheadedness, vision changes, headache, palpitations, chest pain, or leg swelling.    Type 2 diabetes mellitus without complication (HCC)  Chronic stable.  Last A1c: 6.2  Medications: Metformin 500 mg twice daily  ACE: Lisinopril 20 mg daily  Statin: Atorvastatin 10 mg daily  ASA: 81 mg daily  Diabetic diet: Yes.  She does like sweets.  We have counseled her today on reducing her processed food and sweet intake.  Patient verbalized understanding  Exercise: She is not exercising regularly  Checks feet: yes, no sores  Eye exam: August 2019 at [facility].  Kidney function: 10/2019.  Denies polyuria, polydipsia, numbness or tingling in extremities      Health Maintenance: Completed    Patient Active Problem List     Diagnosis Date Noted   • History of squamous cell carcinoma 11/14/2019   • Vitamin D deficiency 11/16/2018   • Obesity (BMI 30-39.9) 09/17/2018   • Eczema of both hands 04/24/2018   • Dyslipidemia 04/24/2018   • Broussard esophagus 04/24/2018   • Essential hypertension 04/24/2018   • Type 2 diabetes mellitus without complication (HCC) 04/24/2018   • Preventative health care 04/24/2018        Allergies:Patient has no known allergies.    Current Outpatient Medications   Medication Sig Dispense Refill   • atorvastatin (LIPITOR) 10 MG Tab Take 1 Tab by mouth every day. 30 Tab 10   • metFORMIN (GLUCOPHAGE) 500 MG Tab TAKE 1 TABLET BY MOUTH TWICE A  Tab 1   • lisinopril (PRINIVIL) 20 MG Tab TAKE 1 TABLET BY MOUTH EVERY DAY 90 Tab 1   • omeprazole (PRILOSEC) 20 MG delayed-release capsule Take 1 Cap by mouth 2 Times a Day. 30 Cap 10   • Omega-3 Fatty Acids (FISH OIL ADULT GUMMIES PO) Take  by mouth.     • aspirin EC (ECOTRIN) 81 MG Tablet Delayed Response Take 81 mg by mouth every day.     • montelukast (SINGULAIR) 10 MG Tab Take 10 mg by mouth every day.  4   • fluticasone (FLONASE) 50 MCG/ACT nasal spray Spray 1 Spray in nose every day.       No current facility-administered medications for this visit.        Social History     Tobacco Use   • Smoking status: Never Smoker   • Smokeless tobacco: Never Used   Substance Use Topics   • Alcohol use: Yes     Comment: 1 per week   • Drug use: No     Social History     Patient does not qualify to have social determinant information on file (likely too young).   Social History Narrative   • Not on file       Family History   Problem Relation Age of Onset   • Diabetes Mother    • Cancer Mother         breast   • Lung Disease Father         COPD   • Diabetes Sister    • Heart Disease Neg Hx    • Stroke Neg Hx        ROS:     No fevers or weight loss  No headaches or sore throat  No chest pain or shortness of breath  No bowel changes  No lower extremity edema  No suicidal  "ideation or panic attack        EXAM:   /80   Pulse 86   Temp 36.8 °C (98.2 °F)   Resp 14   Ht 1.676 m (5' 5.98\")   Wt 91.2 kg (201 lb)   SpO2 92%  Body mass index is 32.46 kg/m².    Gen:         Alert and oriented, No apparent distress.  Neck:        No Lymphadenopathy or Bruits.  Lungs:     Clear to auscultation bilaterally  CV:          Regular rate and rhythm. No murmurs, rubs or gallops.               Ext:          No clubbing, cyanosis, edema.           ASSESSMENT/PLAN:    1. Need for vaccination  - Influenza Vaccine Quad Injection (PF)    2. Dyslipidemia  Well controlled. Continue statin medication. Continue to monitor.    - Lipid Profile; Future    3. Essential hypertension  Chronic stable. Continue lisinopril  Pt understands that lifestyle changes including weight loss, exercise, and low salt/DASH diet along with medication will help pt achieve goal BP.  Pt understands that uncontrolled BP causes increased morbidity and mortality including heart attack, stroke, and kidney failure.         4. Type 2 diabetes mellitus without complication, without long-term current use of insulin (HCC)  Chronic stable.  Continue metformin 500 mg twice daily.  Obtain records from [facility] from last eye exam in August.  - Comp Metabolic Panel; Future  - HEMOGLOBIN A1C; Future    5. Vitamin D deficiency  Chronic stable. Recheck labs.  2000 IU's D3 daily  - VITAMIN D,25 HYDROXY; Future      Return in about 6 months (around 5/14/2020).       Please note that this dictation was created using voice recognition software. I have made every reasonable attempt to correct obvious errors, but I expect that there are errors of grammar and possibly content that I did not discover before finalizing the note.  "

## 2019-11-14 NOTE — TELEPHONE ENCOUNTER
Was the patient seen in the last year in this department? Yes5/9/19    Does patient have an active prescription for medications requested? No     Received Request Via: Pharmacy

## 2019-11-14 NOTE — ASSESSMENT & PLAN NOTE
This is chronic. Stable.  She has not monitoring her blood pressures at home.  Currently taking lisinopril as directed. Also taking baby aspirin. Denies lightheadedness, vision changes, headache, palpitations, chest pain, or leg swelling.

## 2019-11-19 ENCOUNTER — RX ONLY (OUTPATIENT)
Age: 64
Setting detail: RX ONLY
End: 2019-11-19

## 2019-11-19 ENCOUNTER — APPOINTMENT (RX ONLY)
Dept: URBAN - METROPOLITAN AREA CLINIC 35 | Facility: CLINIC | Age: 64
Setting detail: DERMATOLOGY
End: 2019-11-19

## 2019-11-19 PROBLEM — C44.622 SQUAMOUS CELL CARCINOMA OF SKIN OF RIGHT UPPER LIMB, INCLUDING SHOULDER: Status: ACTIVE | Noted: 2019-11-19

## 2019-11-19 PROCEDURE — 11622 EXC S/N/H/F/G MAL+MRG 1.1-2: CPT

## 2019-11-19 PROCEDURE — 12042 INTMD RPR N-HF/GENIT2.6-7.5: CPT

## 2019-11-19 PROCEDURE — ? EXCISION

## 2019-11-19 NOTE — PROCEDURE: MIPS QUALITY
Detail Level: Detailed
Quality 130: Documentation Of Current Medications In The Medical Record: Current Medications Documented
Quality 402: Tobacco Use And Help With Quitting Among Adolescents: Patient screened for tobacco and never smoked
Quality 226: Preventive Care And Screening: Tobacco Use: Screening And Cessation Intervention: Patient screened for tobacco use and is an ex/non-smoker

## 2019-11-19 NOTE — PROCEDURE: EXCISION
Hemostasis: Electrodesiccation
Billing Type: Third-Party Bill
Complex Repair And Rhombic Flap Text: The defect edges were debeveled with a #15 scalpel blade.  The primary defect was closed partially with a complex linear closure.  Given the location of the remaining defect, shape of the defect and the proximity to free margins a rhombic flap was deemed most appropriate for complete closure of the defect.  Using a sterile surgical marker, an appropriate advancement flap was drawn incorporating the defect and placing the expected incisions within the relaxed skin tension lines where possible.    The area thus outlined was incised deep to adipose tissue with a #15 scalpel blade.  The skin margins were undermined to an appropriate distance in all directions utilizing iris scissors.
Mastoid Interpolation Flap Text: A decision was made to reconstruct the defect utilizing an interpolation axial flap and a staged reconstruction.  A telfa template was made of the defect.  This telfa template was then used to outline the mastoid interpolation flap.  The donor area for the pedicle flap was then injected with anesthesia.  The flap was excised through the skin and subcutaneous tissue down to the layer of the underlying musculature.  The pedicle flap was carefully excised within this deep plane to maintain its blood supply.  The edges of the donor site were undermined.   The donor site was closed in a primary fashion.  The pedicle was then rotated into position and sutured.  Once the tube was sutured into place, adequate blood supply was confirmed with blanching and refill.  The pedicle was then wrapped with xeroform gauze and dressed appropriately with a telfa and gauze bandage to ensure continued blood supply and protect the attached pedicle.
Excisional Biopsy Additional Text (Leave Blank If You Do Not Want): The margin was drawn around the clinically apparent lesion. An elliptical shape was then drawn on the skin incorporating the lesion and margins.  Incisions were then made along these lines to the appropriate tissue plane and the lesion was extirpated.
Alar Island Pedicle Flap Text: The defect edges were debeveled with a #15 scalpel blade.  Given the location of the defect, shape of the defect and the proximity to the alar rim an island pedicle advancement flap was deemed most appropriate.  Using a sterile surgical marker, an appropriate advancement flap was drawn incorporating the defect, outlining the appropriate donor tissue and placing the expected incisions within the nasal ala running parallel to the alar rim. The area thus outlined was incised with a #15 scalpel blade.  The skin margins were undermined minimally to an appropriate distance in all directions around the primary defect and laterally outward around the island pedicle utilizing iris scissors.  There was minimal undermining beneath the pedicle flap.
Validate Referring Provider (Can Hide Referring Provider In Settings Tab): No
Positioning (Leave Blank If You Do Not Want): The patient was placed in a comfortable position exposing the surgical site.
Show Date Of Previous Biopsy Variable: Yes
Advancement Flap (Double) Text: The defect edges were debeveled with a #15 scalpel blade.  Given the location of the defect and the proximity to free margins a double advancement flap was deemed most appropriate.  Using a sterile surgical marker, the appropriate advancement flaps were drawn incorporating the defect and placing the expected incisions within the relaxed skin tension lines where possible.    The area thus outlined was incised deep to adipose tissue with a #15 scalpel blade.  The skin margins were undermined to an appropriate distance in all directions utilizing iris scissors.
Complex Repair And Rotation Flap Text: The defect edges were debeveled with a #15 scalpel blade.  The primary defect was closed partially with a complex linear closure.  Given the location of the remaining defect, shape of the defect and the proximity to free margins a rotation flap was deemed most appropriate for complete closure of the defect.  Using a sterile surgical marker, an appropriate advancement flap was drawn incorporating the defect and placing the expected incisions within the relaxed skin tension lines where possible.    The area thus outlined was incised deep to adipose tissue with a #15 scalpel blade.  The skin margins were undermined to an appropriate distance in all directions utilizing iris scissors.
Purse String (Intermediate) Text: Given the location of the defect and the characteristics of the surrounding skin a purse string intermediate closure was deemed most appropriate.  Undermining was performed circumfirentially around the surgical defect.  A purse string suture was then placed and tightened.
M-Plasty Intermediate Repair Preamble Text (Leave Blank If You Do Not Want): Undermining was performed with blunt dissection.
Primary Defect Width (In Cm): 0
Epidermal Sutures: 5-0 Nylon
Complex Repair And Melolabial Flap Text: The defect edges were debeveled with a #15 scalpel blade.  The primary defect was closed partially with a complex linear closure.  Given the location of the remaining defect, shape of the defect and the proximity to free margins a melolabial flap was deemed most appropriate for complete closure of the defect.  Using a sterile surgical marker, an appropriate advancement flap was drawn incorporating the defect and placing the expected incisions within the relaxed skin tension lines where possible.    The area thus outlined was incised deep to adipose tissue with a #15 scalpel blade.  The skin margins were undermined to an appropriate distance in all directions utilizing iris scissors.
Lip Wedge Excision Repair Text: Given the location of the defect and the proximity to free margins a full thickness wedge repair was deemed most appropriate.  Using a sterile surgical marker, the appropriate repair was drawn incorporating the defect and placing the expected incisions perpendicular to the vermilion border.  The vermilion border was also meticulously outlined to ensure appropriate reapproximation during the repair.  The area thus outlined was incised through and through with a #15 scalpel blade.  The muscularis and dermis were reaproximated with deep sutures following hemostasis. Care was taken to realign the vermilion border before proceeding with the superficial closure.  Once the vermilion was realigned the superfical and mucosal closure was finished.
Mercedes Flap Text: The defect edges were debeveled with a #15 scalpel blade.  Given the location of the defect, shape of the defect and the proximity to free margins a Mercedes flap was deemed most appropriate.  Using a sterile surgical marker, an appropriate advancement flap was drawn incorporating the defect and placing the expected incisions within the relaxed skin tension lines where possible. The area thus outlined was incised deep to adipose tissue with a #15 scalpel blade.  The skin margins were undermined to an appropriate distance in all directions utilizing iris scissors.
Complex Repair And V-Y Plasty Text: The defect edges were debeveled with a #15 scalpel blade.  The primary defect was closed partially with a complex linear closure.  Given the location of the remaining defect, shape of the defect and the proximity to free margins a V-Y plasty was deemed most appropriate for complete closure of the defect.  Using a sterile surgical marker, an appropriate advancement flap was drawn incorporating the defect and placing the expected incisions within the relaxed skin tension lines where possible.    The area thus outlined was incised deep to adipose tissue with a #15 scalpel blade.  The skin margins were undermined to an appropriate distance in all directions utilizing iris scissors.
Ftsg Text: The defect edges were debeveled with a #15 scalpel blade.  Given the location of the defect, shape of the defect and the proximity to free margins a full thickness skin graft was deemed most appropriate.  Using a sterile surgical marker, the primary defect shape was transferred to the donor site. The area thus outlined was incised deep to adipose tissue with a #15 scalpel blade.  The harvested graft was then trimmed of adipose tissue until only dermis and epidermis was left.  The skin margins of the secondary defect were undermined to an appropriate distance in all directions utilizing iris scissors.  The secondary defect was closed with interrupted buried subcutaneous sutures.  The skin edges were then re-apposed with running  sutures.  The skin graft was then placed in the primary defect and oriented appropriately.
Lab: [id number]
O-T Advancement Flap Text: The defect edges were debeveled with a #15 scalpel blade.  Given the location of the defect, shape of the defect and the proximity to free margins an O-T advancement flap was deemed most appropriate.  Using a sterile surgical marker, an appropriate advancement flap was drawn incorporating the defect and placing the expected incisions within the relaxed skin tension lines where possible.    The area thus outlined was incised deep to adipose tissue with a #15 scalpel blade.  The skin margins were undermined to an appropriate distance in all directions utilizing iris scissors.
Star Wedge Flap Text: The defect edges were debeveled with a #15 scalpel blade.  Given the location of the defect, shape of the defect and the proximity to free margins a star wedge flap was deemed most appropriate.  Using a sterile surgical marker, an appropriate rotation flap was drawn incorporating the defect and placing the expected incisions within the relaxed skin tension lines where possible. The area thus outlined was incised deep to adipose tissue with a #15 scalpel blade.  The skin margins were undermined to an appropriate distance in all directions utilizing iris scissors.
Bilateral Helical Rim Advancement Flap Text: The defect edges were debeveled with a #15 blade scalpel.  Given the location of the defect and the proximity to free margins (helical rim) a bilateral helical rim advancement flap was deemed most appropriate.  Using a sterile surgical marker, the appropriate advancement flaps were drawn incorporating the defect and placing the expected incisions between the helical rim and antihelix where possible.  The area thus outlined was incised through and through with a #15 scalpel blade.  With a skin hook and iris scissors, the flaps were gently and sharply undermined and freed up.
Anesthesia Type: 0.5% lidocaine with 1:200,000 epinephrine and a 1:10 solution of 8.4% sodium bicarbonate
Lazy S Complex Repair Preamble Text (Leave Blank If You Do Not Want): Extensive wide undermining was performed.
Spiral Flap Text: The defect edges were debeveled with a #15 scalpel blade.  Given the location of the defect, shape of the defect and the proximity to free margins a spiral flap was deemed most appropriate.  Using a sterile surgical marker, an appropriate rotation flap was drawn incorporating the defect and placing the expected incisions within the relaxed skin tension lines where possible. The area thus outlined was incised deep to adipose tissue with a #15 scalpel blade.  The skin margins were undermined to an appropriate distance in all directions utilizing iris scissors.
V-Y Plasty Text: The defect edges were debeveled with a #15 scalpel blade.  Given the location of the defect, shape of the defect and the proximity to free margins an V-Y advancement flap was deemed most appropriate.  Using a sterile surgical marker, an appropriate advancement flap was drawn incorporating the defect and placing the expected incisions within the relaxed skin tension lines where possible.    The area thus outlined was incised deep to adipose tissue with a #15 scalpel blade.  The skin margins were undermined to an appropriate distance in all directions utilizing iris scissors.
Double Island Pedicle Flap Text: The defect edges were debeveled with a #15 scalpel blade.  Given the location of the defect, shape of the defect and the proximity to free margins a double island pedicle advancement flap was deemed most appropriate.  Using a sterile surgical marker, an appropriate advancement flap was drawn incorporating the defect, outlining the appropriate donor tissue and placing the expected incisions within the relaxed skin tension lines where possible.    The area thus outlined was incised deep to adipose tissue with a #15 scalpel blade.  The skin margins were undermined to an appropriate distance in all directions around the primary defect and laterally outward around the island pedicle utilizing iris scissors.  There was minimal undermining beneath the pedicle flap.
Complex Repair And W Plasty Text: The defect edges were debeveled with a #15 scalpel blade.  The primary defect was closed partially with a complex linear closure.  Given the location of the remaining defect, shape of the defect and the proximity to free margins a W plasty was deemed most appropriate for complete closure of the defect.  Using a sterile surgical marker, an appropriate advancement flap was drawn incorporating the defect and placing the expected incisions within the relaxed skin tension lines where possible.    The area thus outlined was incised deep to adipose tissue with a #15 scalpel blade.  The skin margins were undermined to an appropriate distance in all directions utilizing iris scissors.
Information: Selecting Yes will display possible errors in your note based on the variables you have selected. This validation is only offered as a suggestion for you. PLEASE NOTE THAT THE VALIDATION TEXT WILL BE REMOVED WHEN YOU FINALIZE YOUR NOTE. IF YOU WANT TO FAX A PRELIMINARY NOTE YOU WILL NEED TO TOGGLE THIS TO 'NO' IF YOU DO NOT WANT IT IN YOUR FAXED NOTE.
W Plasty Text: The lesion was extirpated to the level of the fat with a #15 scalpel blade.  Given the location of the defect, shape of the defect and the proximity to free margins a W-plasty was deemed most appropriate for repair.  Using a sterile surgical marker, the appropriate transposition arms of the W-plasty were drawn incorporating the defect and placing the expected incisions within the relaxed skin tension lines where possible.    The area thus outlined was incised deep to adipose tissue with a #15 scalpel blade.  The skin margins were undermined to an appropriate distance in all directions utilizing iris scissors.  The opposing transposition arms were then transposed into place in opposite direction and anchored with interrupted buried subcutaneous sutures.
Paramedian Forehead Flap Text: A decision was made to reconstruct the defect utilizing an interpolation axial flap and a staged reconstruction.  A telfa template was made of the defect.  This telfa template was then used to outline the paramedian forehead pedicle flap.  The donor area for the pedicle flap was then injected with anesthesia.  The flap was excised through the skin and subcutaneous tissue down to the layer of the underlying musculature.  The pedicle flap was carefully excised within this deep plane to maintain its blood supply.  The edges of the donor site were undermined.   The donor site was closed in a primary fashion.  The pedicle was then rotated into position and sutured.  Once the tube was sutured into place, adequate blood supply was confirmed with blanching and refill.  The pedicle was then wrapped with xeroform gauze and dressed appropriately with a telfa and gauze bandage to ensure continued blood supply and protect the attached pedicle.
Fusiform Excision Additional Text (Leave Blank If You Do Not Want): The margin was drawn around the clinically apparent lesion.  A fusiform shape was then drawn on the skin incorporating the lesion and margins.  Incisions were then made along these lines to the appropriate tissue plane and the lesion was extirpated.
Intermediate / Complex Repair - Final Wound Length In Cm: 3.5
Complex Repair And Dermal Autograft Text: The defect edges were debeveled with a #15 scalpel blade.  The primary defect was closed partially with a complex linear closure.  Given the location of the defect, shape of the defect and the proximity to free margins an dermal autograft was deemed most appropriate to repair the remaining defect.  The graft was trimmed to fit the size of the remaining defect.  The graft was then placed in the primary defect, oriented appropriately, and sutured into place.
Complex Repair And O-T Advancement Flap Text: The defect edges were debeveled with a #15 scalpel blade.  The primary defect was closed partially with a complex linear closure.  Given the location of the remaining defect, shape of the defect and the proximity to free margins an O-T advancement flap was deemed most appropriate for complete closure of the defect.  Using a sterile surgical marker, an appropriate advancement flap was drawn incorporating the defect and placing the expected incisions within the relaxed skin tension lines where possible.    The area thus outlined was incised deep to adipose tissue with a #15 scalpel blade.  The skin margins were undermined to an appropriate distance in all directions utilizing iris scissors.
Bi-Rhombic Flap Text: The defect edges were debeveled with a #15 scalpel blade.  Given the location of the defect and the proximity to free margins a bi-rhombic flap was deemed most appropriate.  Using a sterile surgical marker, an appropriate rhombic flap was drawn incorporating the defect. The area thus outlined was incised deep to adipose tissue with a #15 scalpel blade.  The skin margins were undermined to an appropriate distance in all directions utilizing iris scissors.
Dorsal Nasal Flap Text: The defect edges were debeveled with a #15 scalpel blade.  Given the location of the defect and the proximity to free margins a dorsal nasal flap was deemed most appropriate.  Using a sterile surgical marker, an appropriate dorsal nasal flap was drawn around the defect.    The area thus outlined was incised deep to adipose tissue with a #15 scalpel blade.  The skin margins were undermined to an appropriate distance in all directions utilizing iris scissors.
Ear Star Wedge Flap Text: The defect edges were debeveled with a #15 blade scalpel.  Given the location of the defect and the proximity to free margins (helical rim) an ear star wedge flap was deemed most appropriate.  Using a sterile surgical marker, the appropriate flap was drawn incorporating the defect and placing the expected incisions between the helical rim and antihelix where possible.  The area thus outlined was incised through and through with a #15 scalpel blade.
Crescentic Advancement Flap Text: The defect edges were debeveled with a #15 scalpel blade.  Given the location of the defect and the proximity to free margins a crescentic advancement flap was deemed most appropriate.  Using a sterile surgical marker, the appropriate advancement flap was drawn incorporating the defect and placing the expected incisions within the relaxed skin tension lines where possible.    The area thus outlined was incised deep to adipose tissue with a #15 scalpel blade.  The skin margins were undermined to an appropriate distance in all directions utilizing iris scissors.
O-Z Plasty Text: The defect edges were debeveled with a #15 scalpel blade.  Given the location of the defect, shape of the defect and the proximity to free margins an O-Z plasty (double transposition flap) was deemed most appropriate.  Using a sterile surgical marker, the appropriate transposition flaps were drawn incorporating the defect and placing the expected incisions within the relaxed skin tension lines where possible.    The area thus outlined was incised deep to adipose tissue with a #15 scalpel blade.  The skin margins were undermined to an appropriate distance in all directions utilizing iris scissors.  Hemostasis was achieved with electrocautery.  The flaps were then transposed into place, one clockwise and the other counterclockwise, and anchored with interrupted buried subcutaneous sutures.
Island Pedicle Flap With Canthal Suspension Text: The defect edges were debeveled with a #15 scalpel blade.  Given the location of the defect, shape of the defect and the proximity to free margins an island pedicle advancement flap was deemed most appropriate.  Using a sterile surgical marker, an appropriate advancement flap was drawn incorporating the defect, outlining the appropriate donor tissue and placing the expected incisions within the relaxed skin tension lines where possible. The area thus outlined was incised deep to adipose tissue with a #15 scalpel blade.  The skin margins were undermined to an appropriate distance in all directions around the primary defect and laterally outward around the island pedicle utilizing iris scissors.  There was minimal undermining beneath the pedicle flap. A suspension suture was placed in the canthal tendon to prevent tension and prevent ectropion.
Tissue Cultured Epidermal Autograft Text: The defect edges were debeveled with a #15 scalpel blade.  Given the location of the defect, shape of the defect and the proximity to free margins a tissue cultured epidermal autograft was deemed most appropriate.  The graft was then trimmed to fit the size of the defect.  The graft was then placed in the primary defect and oriented appropriately.
Slit Excision Additional Text (Leave Blank If You Do Not Want): A linear line was drawn on the skin overlying the lesion. An incision was made slowly until the lesion was visualized.  Once visualized, the lesion was removed with blunt dissection.
Double O-Z Plasty Text: The defect edges were debeveled with a #15 scalpel blade.  Given the location of the defect, shape of the defect and the proximity to free margins a Double O-Z plasty (double transposition flap) was deemed most appropriate.  Using a sterile surgical marker, the appropriate transposition flaps were drawn incorporating the defect and placing the expected incisions within the relaxed skin tension lines where possible. The area thus outlined was incised deep to adipose tissue with a #15 scalpel blade.  The skin margins were undermined to an appropriate distance in all directions utilizing iris scissors.  Hemostasis was achieved with electrocautery.  The flaps were then transposed into place, one clockwise and the other counterclockwise, and anchored with interrupted buried subcutaneous sutures.
No Repair - Repaired With Adjacent Surgical Defect Text (Leave Blank If You Do Not Want): After the excision the defect was repaired concurrently with another surgical defect which was in close approximation.
Muscle Hinge Flap Text: The defect edges were debeveled with a #15 scalpel blade.  Given the size, depth and location of the defect and the proximity to free margins a muscle hinge flap was deemed most appropriate.  Using a sterile surgical marker, an appropriate hinge flap was drawn incorporating the defect. The area thus outlined was incised with a #15 scalpel blade.  The skin margins were undermined to an appropriate distance in all directions utilizing iris scissors.
Modified Advancement Flap Text: The defect edges were debeveled with a #15 scalpel blade.  Given the location of the defect, shape of the defect and the proximity to free margins a modified advancement flap was deemed most appropriate.  Using a sterile surgical marker, an appropriate advancement flap was drawn incorporating the defect and placing the expected incisions within the relaxed skin tension lines where possible.    The area thus outlined was incised deep to adipose tissue with a #15 scalpel blade.  The skin margins were undermined to an appropriate distance in all directions utilizing iris scissors.
Complex Repair And Bilobe Flap Text: The defect edges were debeveled with a #15 scalpel blade.  The primary defect was closed partially with a complex linear closure.  Given the location of the remaining defect, shape of the defect and the proximity to free margins a bilobe flap was deemed most appropriate for complete closure of the defect.  Using a sterile surgical marker, an appropriate advancement flap was drawn incorporating the defect and placing the expected incisions within the relaxed skin tension lines where possible.    The area thus outlined was incised deep to adipose tissue with a #15 scalpel blade.  The skin margins were undermined to an appropriate distance in all directions utilizing iris scissors.
V-Y Flap Text: The defect edges were debeveled with a #15 scalpel blade.  Given the location of the defect, shape of the defect and the proximity to free margins a V-Y flap was deemed most appropriate.  Using a sterile surgical marker, an appropriate advancement flap was drawn incorporating the defect and placing the expected incisions within the relaxed skin tension lines where possible.    The area thus outlined was incised deep to adipose tissue with a #15 scalpel blade.  The skin margins were undermined to an appropriate distance in all directions utilizing iris scissors.
Rotation Flap Text: The defect edges were debeveled with a #15 scalpel blade.  Given the location of the defect, shape of the defect and the proximity to free margins a rotation flap was deemed most appropriate.  Using a sterile surgical marker, an appropriate rotation flap was drawn incorporating the defect and placing the expected incisions within the relaxed skin tension lines where possible.    The area thus outlined was incised deep to adipose tissue with a #15 scalpel blade.  The skin margins were undermined to an appropriate distance in all directions utilizing iris scissors.
Elliptical Excision Additional Text (Leave Blank If You Do Not Want): The margin was drawn around the clinically apparent lesion.  An elliptical shape was then drawn on the skin incorporating the lesion and margins.  Incisions were then made along these lines to the appropriate tissue plane and the lesion was extirpated.
Trilobed Flap Text: The defect edges were debeveled with a #15 scalpel blade.  Given the location of the defect and the proximity to free margins a trilobed flap was deemed most appropriate.  Using a sterile surgical marker, an appropriate trilobed flap drawn around the defect.    The area thus outlined was incised deep to adipose tissue with a #15 scalpel blade.  The skin margins were undermined to an appropriate distance in all directions utilizing iris scissors.
Complex Repair And M Plasty Text: The defect edges were debeveled with a #15 scalpel blade.  The primary defect was closed partially with a complex linear closure.  Given the location of the remaining defect, shape of the defect and the proximity to free margins an M plasty was deemed most appropriate for complete closure of the defect.  Using a sterile surgical marker, an appropriate advancement flap was drawn incorporating the defect and placing the expected incisions within the relaxed skin tension lines where possible.    The area thus outlined was incised deep to adipose tissue with a #15 scalpel blade.  The skin margins were undermined to an appropriate distance in all directions utilizing iris scissors.
Post-Care Instructions: I reviewed with the patient in detail post-care instructions. Patient is not to engage in any heavy lifting, exercise which causes pulling on the excision site, or swimming for the next 14 days. The original dressing should be left in place for 2-3 days unless it becomes wet.  The dressing should then be changed daily with fresh petrolatum applied with a clean Q-tip and new bandage placed until the sutures are removed.  Should the patient develop any fevers, chills, bleeding, severe pain patient will contact the office immediately.
Scalpel Size: 15 blade
Curvilinear Excision Additional Text (Leave Blank If You Do Not Want): The margin was drawn around the clinically apparent lesion.  A curvilinear shape was then drawn on the skin incorporating the lesion and margins.  Incisions were then made along these lines to the appropriate tissue plane and the lesion was extirpated.
Rhomboid Transposition Flap Text: The defect edges were debeveled with a #15 scalpel blade.  Given the location of the defect and the proximity to free margins a rhomboid transposition flap was deemed most appropriate.  Using a sterile surgical marker, an appropriate rhomboid flap was drawn incorporating the defect.    The area thus outlined was incised deep to adipose tissue with a #15 scalpel blade.  The skin margins were undermined to an appropriate distance in all directions utilizing iris scissors.
Complex Repair And Double M Plasty Text: The defect edges were debeveled with a #15 scalpel blade.  The primary defect was closed partially with a complex linear closure.  Given the location of the remaining defect, shape of the defect and the proximity to free margins a double M plasty was deemed most appropriate for complete closure of the defect.  Using a sterile surgical marker, an appropriate advancement flap was drawn incorporating the defect and placing the expected incisions within the relaxed skin tension lines where possible.    The area thus outlined was incised deep to adipose tissue with a #15 scalpel blade.  The skin margins were undermined to an appropriate distance in all directions utilizing iris scissors.
Split-Thickness Skin Graft Text: The defect edges were debeveled with a #15 scalpel blade.  Given the location of the defect, shape of the defect and the proximity to free margins a split thickness skin graft was deemed most appropriate.  Using a sterile surgical marker, the primary defect shape was transferred to the donor site. The split thickness graft was then harvested.  The skin graft was then placed in the primary defect and oriented appropriately.
Size Of Lesion In Cm: 1
Advancement-Rotation Flap Text: The defect edges were debeveled with a #15 scalpel blade.  Given the location of the defect, shape of the defect and the proximity to free margins an advancement-rotation flap was deemed most appropriate.  Using a sterile surgical marker, an appropriate flap was drawn incorporating the defect and placing the expected incisions within the relaxed skin tension lines where possible. The area thus outlined was incised deep to adipose tissue with a #15 scalpel blade.  The skin margins were undermined to an appropriate distance in all directions utilizing iris scissors.
Complex Repair And Ftsg Text: The defect edges were debeveled with a #15 scalpel blade.  The primary defect was closed partially with a complex linear closure.  Given the location of the defect, shape of the defect and the proximity to free margins a full thickness skin graft was deemed most appropriate to repair the remaining defect.  The graft was trimmed to fit the size of the remaining defect.  The graft was then placed in the primary defect, oriented appropriately, and sutured into place.
Mucosal Advancement Flap Text: Given the location of the defect, shape of the defect and the proximity to free margins a mucosal advancement flap was deemed most appropriate. Incisions were made with a 15 blade scalpel in the appropriate fashion along the cutaneous vermilion border and the mucosal lip. The remaining actinically damaged mucosal tissue was excised.  The mucosal advancement flap was then elevated to the gingival sulcus with care taken to preserve the neurovascular structures and advanced into the primary defect. Care was taken to ensure that precise realignment of the vermilion border was achieved.
S Plasty Text: Given the location and shape of the defect, and the orientation of relaxed skin tension lines, an S-plasty was deemed most appropriate for repair.  Using a sterile surgical marker, the appropriate outline of the S-plasty was drawn, incorporating the defect and placing the expected incisions within the relaxed skin tension lines where possible.  The area thus outlined was incised deep to adipose tissue with a #15 scalpel blade.  The skin margins were undermined to an appropriate distance in all directions utilizing iris scissors. The skin flaps were advanced over the defect.  The opposing margins were then approximated with interrupted buried subcutaneous sutures.
Cheek Interpolation Flap Text: A decision was made to reconstruct the defect utilizing an interpolation axial flap and a staged reconstruction.  A telfa template was made of the defect.  This telfa template was then used to outline the Cheek Interpolation flap.  The donor area for the pedicle flap was then injected with anesthesia.  The flap was excised through the skin and subcutaneous tissue down to the layer of the underlying musculature.  The interpolation flap was carefully excised within this deep plane to maintain its blood supply.  The edges of the donor site were undermined.   The donor site was closed in a primary fashion.  The pedicle was then rotated into position and sutured.  Once the tube was sutured into place, adequate blood supply was confirmed with blanching and refill.  The pedicle was then wrapped with xeroform gauze and dressed appropriately with a telfa and gauze bandage to ensure continued blood supply and protect the attached pedicle.
O-T Plasty Text: The defect edges were debeveled with a #15 scalpel blade.  Given the location of the defect, shape of the defect and the proximity to free margins an O-T plasty was deemed most appropriate.  Using a sterile surgical marker, an appropriate O-T plasty was drawn incorporating the defect and placing the expected incisions within the relaxed skin tension lines where possible.    The area thus outlined was incised deep to adipose tissue with a #15 scalpel blade.  The skin margins were undermined to an appropriate distance in all directions utilizing iris scissors.
Deep Sutures: 5-0 Vicryl
Number Of Epidermal Sutures (Optional): 7
Posterior Auricular Interpolation Flap Text: A decision was made to reconstruct the defect utilizing an interpolation axial flap and a staged reconstruction.  A telfa template was made of the defect.  This telfa template was then used to outline the posterior auricular interpolation flap.  The donor area for the pedicle flap was then injected with anesthesia.  The flap was excised through the skin and subcutaneous tissue down to the layer of the underlying musculature.  The pedicle flap was carefully excised within this deep plane to maintain its blood supply.  The edges of the donor site were undermined.   The donor site was closed in a primary fashion.  The pedicle was then rotated into position and sutured.  Once the tube was sutured into place, adequate blood supply was confirmed with blanching and refill.  The pedicle was then wrapped with xeroform gauze and dressed appropriately with a telfa and gauze bandage to ensure continued blood supply and protect the attached pedicle.
Repair Type: Intermediate
Melolabial Interpolation Flap Text: A decision was made to reconstruct the defect utilizing an interpolation axial flap and a staged reconstruction.  A telfa template was made of the defect.  This telfa template was then used to outline the melolabial interpolation flap.  The donor area for the pedicle flap was then injected with anesthesia.  The flap was excised through the skin and subcutaneous tissue down to the layer of the underlying musculature.  The pedicle flap was carefully excised within this deep plane to maintain its blood supply.  The edges of the donor site were undermined.   The donor site was closed in a primary fashion.  The pedicle was then rotated into position and sutured.  Once the tube was sutured into place, adequate blood supply was confirmed with blanching and refill.  The pedicle was then wrapped with xeroform gauze and dressed appropriately with a telfa and gauze bandage to ensure continued blood supply and protect the attached pedicle.
Complex Repair And Modified Advancement Flap Text: The defect edges were debeveled with a #15 scalpel blade.  The primary defect was closed partially with a complex linear closure.  Given the location of the remaining defect, shape of the defect and the proximity to free margins a modified advancement flap was deemed most appropriate for complete closure of the defect.  Using a sterile surgical marker, an appropriate advancement flap was drawn incorporating the defect and placing the expected incisions within the relaxed skin tension lines where possible.    The area thus outlined was incised deep to adipose tissue with a #15 scalpel blade.  The skin margins were undermined to an appropriate distance in all directions utilizing iris scissors.
Complex Repair And Tissue Cultured Epidermal Autograft Text: The defect edges were debeveled with a #15 scalpel blade.  The primary defect was closed partially with a complex linear closure.  Given the location of the defect, shape of the defect and the proximity to free margins an tissue cultured epidermal autograft was deemed most appropriate to repair the remaining defect.  The graft was trimmed to fit the size of the remaining defect.  The graft was then placed in the primary defect, oriented appropriately, and sutured into place.
Complex Repair And Dorsal Nasal Flap Text: The defect edges were debeveled with a #15 scalpel blade.  The primary defect was closed partially with a complex linear closure.  Given the location of the remaining defect, shape of the defect and the proximity to free margins a dorsal nasal flap was deemed most appropriate for complete closure of the defect.  Using a sterile surgical marker, an appropriate flap was drawn incorporating the defect and placing the expected incisions within the relaxed skin tension lines where possible.    The area thus outlined was incised deep to adipose tissue with a #15 scalpel blade.  The skin margins were undermined to an appropriate distance in all directions utilizing iris scissors.
Hatchet Flap Text: The defect edges were debeveled with a #15 scalpel blade.  Given the location of the defect, shape of the defect and the proximity to free margins a hatchet flap was deemed most appropriate.  Using a sterile surgical marker, an appropriate hatchet flap was drawn incorporating the defect and placing the expected incisions within the relaxed skin tension lines where possible.    The area thus outlined was incised deep to adipose tissue with a #15 scalpel blade.  The skin margins were undermined to an appropriate distance in all directions utilizing iris scissors.
Chonodrocutaneous Helical Advancement Flap Text: The defect edges were debeveled with a #15 scalpel blade.  Given the location of the defect and the proximity to free margins a chondrocutaneous helical advancement flap was deemed most appropriate.  Using a sterile surgical marker, the appropriate advancement flap was drawn incorporating the defect and placing the expected incisions within the relaxed skin tension lines where possible.    The area thus outlined was incised deep to adipose tissue with a #15 scalpel blade.  The skin margins were undermined to an appropriate distance in all directions utilizing iris scissors.
Consent was obtained from the patient. The risks and benefits to therapy were discussed in detail. Specifically, the risks of infection, scarring, bleeding, prolonged wound healing, incomplete removal, allergy to anesthesia, nerve injury and recurrence were addressed. Prior to the procedure, the treatment site was clearly identified and confirmed by the patient. All components of Universal Protocol/PAUSE Rule completed.
Bilobed Flap Text: The defect edges were debeveled with a #15 scalpel blade.  Given the location of the defect and the proximity to free margins a bilobe flap was deemed most appropriate.  Using a sterile surgical marker, an appropriate bilobe flap drawn around the defect.    The area thus outlined was incised deep to adipose tissue with a #15 scalpel blade.  The skin margins were undermined to an appropriate distance in all directions utilizing iris scissors.
Burow's Advancement Flap Text: The defect edges were debeveled with a #15 scalpel blade.  Given the location of the defect and the proximity to free margins a Burow's advancement flap was deemed most appropriate.  Using a sterile surgical marker, the appropriate advancement flap was drawn incorporating the defect and placing the expected incisions within the relaxed skin tension lines where possible.    The area thus outlined was incised deep to adipose tissue with a #15 scalpel blade.  The skin margins were undermined to an appropriate distance in all directions utilizing iris scissors.
Complex Repair And Z Plasty Text: The defect edges were debeveled with a #15 scalpel blade.  The primary defect was closed partially with a complex linear closure.  Given the location of the remaining defect, shape of the defect and the proximity to free margins a Z plasty was deemed most appropriate for complete closure of the defect.  Using a sterile surgical marker, an appropriate advancement flap was drawn incorporating the defect and placing the expected incisions within the relaxed skin tension lines where possible.    The area thus outlined was incised deep to adipose tissue with a #15 scalpel blade.  The skin margins were undermined to an appropriate distance in all directions utilizing iris scissors.
Z Plasty Text: The lesion was extirpated to the level of the fat with a #15 scalpel blade.  Given the location of the defect, shape of the defect and the proximity to free margins a Z-plasty was deemed most appropriate for repair.  Using a sterile surgical marker, the appropriate transposition arms of the Z-plasty were drawn incorporating the defect and placing the expected incisions within the relaxed skin tension lines where possible.    The area thus outlined was incised deep to adipose tissue with a #15 scalpel blade.  The skin margins were undermined to an appropriate distance in all directions utilizing iris scissors.  The opposing transposition arms were then transposed into place in opposite direction and anchored with interrupted buried subcutaneous sutures.
Transposition Flap Text: The defect edges were debeveled with a #15 scalpel blade.  Given the location of the defect and the proximity to free margins a transposition flap was deemed most appropriate.  Using a sterile surgical marker, an appropriate transposition flap was drawn incorporating the defect.    The area thus outlined was incised deep to adipose tissue with a #15 scalpel blade.  The skin margins were undermined to an appropriate distance in all directions utilizing iris scissors.
O-L Flap Text: The defect edges were debeveled with a #15 scalpel blade.  Given the location of the defect, shape of the defect and the proximity to free margins an O-L flap was deemed most appropriate.  Using a sterile surgical marker, an appropriate advancement flap was drawn incorporating the defect and placing the expected incisions within the relaxed skin tension lines where possible.    The area thus outlined was incised deep to adipose tissue with a #15 scalpel blade.  The skin margins were undermined to an appropriate distance in all directions utilizing iris scissors.
Interpolation Flap Text: A decision was made to reconstruct the defect utilizing an interpolation axial flap and a staged reconstruction.  A telfa template was made of the defect.  This telfa template was then used to outline the interpolation flap.  The donor area for the pedicle flap was then injected with anesthesia.  The flap was excised through the skin and subcutaneous tissue down to the layer of the underlying musculature.  The interpolation flap was carefully excised within this deep plane to maintain its blood supply.  The edges of the donor site were undermined.   The donor site was closed in a primary fashion.  The pedicle was then rotated into position and sutured.  Once the tube was sutured into place, adequate blood supply was confirmed with blanching and refill.  The pedicle was then wrapped with xeroform gauze and dressed appropriately with a telfa and gauze bandage to ensure continued blood supply and protect the attached pedicle.
Excision Method: Elliptical
Complex Repair And Epidermal Autograft Text: The defect edges were debeveled with a #15 scalpel blade.  The primary defect was closed partially with a complex linear closure.  Given the location of the defect, shape of the defect and the proximity to free margins an epidermal autograft was deemed most appropriate to repair the remaining defect.  The graft was trimmed to fit the size of the remaining defect.  The graft was then placed in the primary defect, oriented appropriately, and sutured into place.
Bilobed Transposition Flap Text: The defect edges were debeveled with a #15 scalpel blade.  Given the location of the defect and the proximity to free margins a bilobed transposition flap was deemed most appropriate.  Using a sterile surgical marker, an appropriate bilobe flap drawn around the defect.    The area thus outlined was incised deep to adipose tissue with a #15 scalpel blade.  The skin margins were undermined to an appropriate distance in all directions utilizing iris scissors.
Skin Substitute Text: The defect edges were debeveled with a #15 scalpel blade.  Given the location of the defect, shape of the defect and the proximity to free margins a skin substitute graft was deemed most appropriate.  The graft material was trimmed to fit the size of the defect. The graft was then placed in the primary defect and oriented appropriately.
O-Z Flap Text: The defect edges were debeveled with a #15 scalpel blade.  Given the location of the defect, shape of the defect and the proximity to free margins an O-Z flap was deemed most appropriate.  Using a sterile surgical marker, an appropriate transposition flap was drawn incorporating the defect and placing the expected incisions within the relaxed skin tension lines where possible. The area thus outlined was incised deep to adipose tissue with a #15 scalpel blade.  The skin margins were undermined to an appropriate distance in all directions utilizing iris scissors.
Helical Rim Advancement Flap Text: The defect edges were debeveled with a #15 blade scalpel.  Given the location of the defect and the proximity to free margins (helical rim) a double helical rim advancement flap was deemed most appropriate.  Using a sterile surgical marker, the appropriate advancement flaps were drawn incorporating the defect and placing the expected incisions between the helical rim and antihelix where possible.  The area thus outlined was incised through and through with a #15 scalpel blade.  With a skin hook and iris scissors, the flaps were gently and sharply undermined and freed up.
Excision Depth: adipose tissue
Keystone Flap Text: The defect edges were debeveled with a #15 scalpel blade.  Given the location of the defect, shape of the defect a keystone flap was deemed most appropriate.  Using a sterile surgical marker, an appropriate keystone flap was drawn incorporating the defect, outlining the appropriate donor tissue and placing the expected incisions within the relaxed skin tension lines where possible. The area thus outlined was incised deep to adipose tissue with a #15 scalpel blade.  The skin margins were undermined to an appropriate distance in all directions around the primary defect and laterally outward around the flap utilizing iris scissors.
Complex Repair And O-L Flap Text: The defect edges were debeveled with a #15 scalpel blade.  The primary defect was closed partially with a complex linear closure.  Given the location of the remaining defect, shape of the defect and the proximity to free margins an O-L flap was deemed most appropriate for complete closure of the defect.  Using a sterile surgical marker, an appropriate flap was drawn incorporating the defect and placing the expected incisions within the relaxed skin tension lines where possible.    The area thus outlined was incised deep to adipose tissue with a #15 scalpel blade.  The skin margins were undermined to an appropriate distance in all directions utilizing iris scissors.
Epidermal Closure: simple interrupted
Rhombic Flap Text: The defect edges were debeveled with a #15 scalpel blade.  Given the location of the defect and the proximity to free margins a rhombic flap was deemed most appropriate.  Using a sterile surgical marker, an appropriate rhombic flap was drawn incorporating the defect.    The area thus outlined was incised deep to adipose tissue with a #15 scalpel blade.  The skin margins were undermined to an appropriate distance in all directions utilizing iris scissors.
Epidermal Autograft Text: The defect edges were debeveled with a #15 scalpel blade.  Given the location of the defect, shape of the defect and the proximity to free margins an epidermal autograft was deemed most appropriate.  Using a sterile surgical marker, the primary defect shape was transferred to the donor site. The epidermal graft was then harvested.  The skin graft was then placed in the primary defect and oriented appropriately.
Anesthesia Volume In Cc: 6
Anesthesia Type: 1% lidocaine with epinephrine
Partial Purse String (Simple) Text: Given the location of the defect and the characteristics of the surrounding skin a simple purse string closure was deemed most appropriate.  Undermining was performed circumferentially around the surgical defect.  A purse string suture was then placed and tightened. Wound tension of the circular defect prevented complete closure of the wound.
Double O-Z Flap Text: The defect edges were debeveled with a #15 scalpel blade.  Given the location of the defect, shape of the defect and the proximity to free margins a Double O-Z flap was deemed most appropriate.  Using a sterile surgical marker, an appropriate transposition flap was drawn incorporating the defect and placing the expected incisions within the relaxed skin tension lines where possible. The area thus outlined was incised deep to adipose tissue with a #15 scalpel blade.  The skin margins were undermined to an appropriate distance in all directions utilizing iris scissors.
Suture Removal: 14 days
Island Pedicle Flap Text: The defect edges were debeveled with a #15 scalpel blade.  Given the location of the defect, shape of the defect and the proximity to free margins an island pedicle advancement flap was deemed most appropriate.  Using a sterile surgical marker, an appropriate advancement flap was drawn incorporating the defect, outlining the appropriate donor tissue and placing the expected incisions within the relaxed skin tension lines where possible.    The area thus outlined was incised deep to adipose tissue with a #15 scalpel blade.  The skin margins were undermined to an appropriate distance in all directions around the primary defect and laterally outward around the island pedicle utilizing iris scissors.  There was minimal undermining beneath the pedicle flap.
Dermal Autograft Text: The defect edges were debeveled with a #15 scalpel blade.  Given the location of the defect, shape of the defect and the proximity to free margins a dermal autograft was deemed most appropriate.  Using a sterile surgical marker, the primary defect shape was transferred to the donor site. The area thus outlined was incised deep to adipose tissue with a #15 scalpel blade.  The harvested graft was then trimmed of adipose and epidermal tissue until only dermis was left.  The skin graft was then placed in the primary defect and oriented appropriately.
Lab Facility: 
Detail Level: Detailed
Complex Repair And Split-Thickness Skin Graft Text: The defect edges were debeveled with a #15 scalpel blade.  The primary defect was closed partially with a complex linear closure.  Given the location of the defect, shape of the defect and the proximity to free margins a split thickness skin graft was deemed most appropriate to repair the remaining defect.  The graft was trimmed to fit the size of the remaining defect.  The graft was then placed in the primary defect, oriented appropriately, and sutured into place.
Dermal Closure: buried vertical mattress
Saucerization Excision Additional Text (Leave Blank If You Do Not Want): The margin was drawn around the clinically apparent lesion.  Incisions were then made along these lines, in a tangential fashion, to the appropriate tissue plane and the lesion was extirpated.
Cartilage Graft Text: The defect edges were debeveled with a #15 scalpel blade.  Given the location of the defect, shape of the defect, the fact the defect involved a full thickness cartilage defect a cartilage graft was deemed most appropriate.  An appropriate donor site was identified, cleansed, and anesthetized. The cartilage graft was then harvested and transferred to the recipient site, oriented appropriately and then sutured into place.  The secondary defect was then repaired using a primary closure.
Complex Repair And Double Advancement Flap Text: The defect edges were debeveled with a #15 scalpel blade.  The primary defect was closed partially with a complex linear closure.  Given the location of the remaining defect, shape of the defect and the proximity to free margins a double advancement flap was deemed most appropriate for complete closure of the defect.  Using a sterile surgical marker, an appropriate advancement flap was drawn incorporating the defect and placing the expected incisions within the relaxed skin tension lines where possible.    The area thus outlined was incised deep to adipose tissue with a #15 scalpel blade.  The skin margins were undermined to an appropriate distance in all directions utilizing iris scissors.
Composite Graft Text: The defect edges were debeveled with a #15 scalpel blade.  Given the location of the defect, shape of the defect, the proximity to free margins and the fact the defect was full thickness a composite graft was deemed most appropriate.  The defect was outline and then transferred to the donor site.  A full thickness graft was then excised from the donor site. The graft was then placed in the primary defect, oriented appropriately and then sutured into place.  The secondary defect was then repaired using a primary closure.
Partial Purse String (Intermediate) Text: Given the location of the defect and the characteristics of the surrounding skin an intermediate purse string closure was deemed most appropriate.  Undermining was performed circumferentially around the surgical defect.  A purse string suture was then placed and tightened. Wound tension of the circular defect prevented complete closure of the wound.
Repair Performed By Another Provider Text (Leave Blank If You Do Not Want): After the tissue was excised the defect was repaired by another provider.
Wound Care: Petrolatum
Purse String (Simple) Text: Given the location of the defect and the characteristics of the surrounding skin a purse string simple closure was deemed most appropriate.  Undermining was performed circumferentially around the surgical defect.  A purse string suture was then placed and tightened.
Undermining Location (Optional): in the superficial subcutaneous fat
Complex Repair And Xenograft Text: The defect edges were debeveled with a #15 scalpel blade.  The primary defect was closed partially with a complex linear closure.  Given the location of the defect, shape of the defect and the proximity to free margins a xenograft was deemed most appropriate to repair the remaining defect.  The graft was trimmed to fit the size of the remaining defect.  The graft was then placed in the primary defect, oriented appropriately, and sutured into place.
Melolabial Transposition Flap Text: The defect edges were debeveled with a #15 scalpel blade.  Given the location of the defect and the proximity to free margins a melolabial flap was deemed most appropriate.  Using a sterile surgical marker, an appropriate melolabial transposition flap was drawn incorporating the defect.    The area thus outlined was incised deep to adipose tissue with a #15 scalpel blade.  The skin margins were undermined to an appropriate distance in all directions utilizing iris scissors.
Estimated Blood Loss (Cc): minimal
Complex Repair And A-T Advancement Flap Text: The defect edges were debeveled with a #15 scalpel blade.  The primary defect was closed partially with a complex linear closure.  Given the location of the remaining defect, shape of the defect and the proximity to free margins an A-T advancement flap was deemed most appropriate for complete closure of the defect.  Using a sterile surgical marker, an appropriate advancement flap was drawn incorporating the defect and placing the expected incisions within the relaxed skin tension lines where possible.    The area thus outlined was incised deep to adipose tissue with a #15 scalpel blade.  The skin margins were undermined to an appropriate distance in all directions utilizing iris scissors.
Graft Donor Site Bandage (Optional-Leave Blank If You Don't Want In Note): Steri-strips and a pressure bandage were applied to the donor site.
Cheek-To-Nose Interpolation Flap Text: A decision was made to reconstruct the defect utilizing an interpolation axial flap and a staged reconstruction.  A telfa template was made of the defect.  This telfa template was then used to outline the Cheek-To-Nose Interpolation flap.  The donor area for the pedicle flap was then injected with anesthesia.  The flap was excised through the skin and subcutaneous tissue down to the layer of the underlying musculature.  The interpolation flap was carefully excised within this deep plane to maintain its blood supply.  The edges of the donor site were undermined.   The donor site was closed in a primary fashion.  The pedicle was then rotated into position and sutured.  Once the tube was sutured into place, adequate blood supply was confirmed with blanching and refill.  The pedicle was then wrapped with xeroform gauze and dressed appropriately with a telfa and gauze bandage to ensure continued blood supply and protect the attached pedicle.
Complex Repair And Single Advancement Flap Text: The defect edges were debeveled with a #15 scalpel blade.  The primary defect was closed partially with a complex linear closure.  Given the location of the remaining defect, shape of the defect and the proximity to free margins a single advancement flap was deemed most appropriate for complete closure of the defect.  Using a sterile surgical marker, an appropriate advancement flap was drawn incorporating the defect and placing the expected incisions within the relaxed skin tension lines where possible.    The area thus outlined was incised deep to adipose tissue with a #15 scalpel blade.  The skin margins were undermined to an appropriate distance in all directions utilizing iris scissors.
Dressing: dry sterile dressing
Xenograft Text: The defect edges were debeveled with a #15 scalpel blade.  Given the location of the defect, shape of the defect and the proximity to free margins a xenograft was deemed most appropriate.  The graft was then trimmed to fit the size of the defect.  The graft was then placed in the primary defect and oriented appropriately.
Size Of Margin In Cm: 0.4
Complex Repair And Skin Substitute Graft Text: The defect edges were debeveled with a #15 scalpel blade.  The primary defect was closed partially with a complex linear closure.  Given the location of the remaining defect, shape of the defect and the proximity to free margins a skin substitute graft was deemed most appropriate to repair the remaining defect.  The graft was trimmed to fit the size of the remaining defect.  The graft was then placed in the primary defect, oriented appropriately, and sutured into place.
Home Suture Removal Text: Patient will remove sutures at home.  If they have any questions or difficulties they will call the office.
Advancement Flap (Single) Text: The defect edges were debeveled with a #15 scalpel blade.  Given the location of the defect and the proximity to free margins a single advancement flap was deemed most appropriate.  Using a sterile surgical marker, an appropriate advancement flap was drawn incorporating the defect and placing the expected incisions within the relaxed skin tension lines where possible.    The area thus outlined was incised deep to adipose tissue with a #15 scalpel blade.  The skin margins were undermined to an appropriate distance in all directions utilizing iris scissors.
A-T Advancement Flap Text: The defect edges were debeveled with a #15 scalpel blade.  Given the location of the defect, shape of the defect and the proximity to free margins an A-T advancement flap was deemed most appropriate.  Using a sterile surgical marker, an appropriate advancement flap was drawn incorporating the defect and placing the expected incisions within the relaxed skin tension lines where possible.    The area thus outlined was incised deep to adipose tissue with a #15 scalpel blade.  The skin margins were undermined to an appropriate distance in all directions utilizing iris scissors.
Pre-Excision Curettage Text (Leave Blank If You Do Not Want): Prior to drawing the surgical margin the visible lesion was removed with electrodesiccation and curettage to clearly define the lesion size.
H Plasty Text: Given the location of the defect, shape of the defect and the proximity to free margins a H-plasty was deemed most appropriate for repair.  Using a sterile surgical marker, the appropriate advancement arms of the H-plasty were drawn incorporating the defect and placing the expected incisions within the relaxed skin tension lines where possible. The area thus outlined was incised deep to adipose tissue with a #15 scalpel blade. The skin margins were undermined to an appropriate distance in all directions utilizing iris scissors.  The opposing advancement arms were then advanced into place in opposite direction and anchored with interrupted buried subcutaneous sutures.
Island Pedicle Flap-Requiring Vessel Identification Text: The defect edges were debeveled with a #15 scalpel blade.  Given the location of the defect, shape of the defect and the proximity to free margins an island pedicle advancement flap was deemed most appropriate.  Using a sterile surgical marker, an appropriate advancement flap was drawn, based on the axial vessel mentioned above, incorporating the defect, outlining the appropriate donor tissue and placing the expected incisions within the relaxed skin tension lines where possible.    The area thus outlined was incised deep to adipose tissue with a #15 scalpel blade.  The skin margins were undermined to an appropriate distance in all directions around the primary defect and laterally outward around the island pedicle utilizing iris scissors.  There was minimal undermining beneath the pedicle flap.
Complex Repair And Transposition Flap Text: The defect edges were debeveled with a #15 scalpel blade.  The primary defect was closed partially with a complex linear closure.  Given the location of the remaining defect, shape of the defect and the proximity to free margins a transposition flap was deemed most appropriate for complete closure of the defect.  Using a sterile surgical marker, an appropriate advancement flap was drawn incorporating the defect and placing the expected incisions within the relaxed skin tension lines where possible.    The area thus outlined was incised deep to adipose tissue with a #15 scalpel blade.  The skin margins were undermined to an appropriate distance in all directions utilizing iris scissors.
Number Of Deep Sutures (Optional): 5
Perilesional Excision Additional Text (Leave Blank If You Do Not Want): The margin was drawn around the clinically apparent lesion. Incisions were then made along these lines to the appropriate tissue plane and the lesion was extirpated.
Banner Transposition Flap Text: The defect edges were debeveled with a #15 scalpel blade.  Given the location of the defect and the proximity to free margins a Banner transposition flap was deemed most appropriate.  Using a sterile surgical marker, an appropriate flap drawn around the defect. The area thus outlined was incised deep to adipose tissue with a #15 scalpel blade.  The skin margins were undermined to an appropriate distance in all directions utilizing iris scissors.

## 2019-12-04 ENCOUNTER — APPOINTMENT (RX ONLY)
Dept: URBAN - METROPOLITAN AREA CLINIC 35 | Facility: CLINIC | Age: 64
Setting detail: DERMATOLOGY
End: 2019-12-04

## 2019-12-04 DIAGNOSIS — L90.5 SCAR CONDITIONS AND FIBROSIS OF SKIN: ICD-10-CM

## 2019-12-04 PROCEDURE — 99211 OFF/OP EST MAY X REQ PHY/QHP: CPT

## 2019-12-04 PROCEDURE — ? SUTURE REMOVAL (NO GLOBAL PERIOD)

## 2019-12-04 ASSESSMENT — LOCATION SIMPLE DESCRIPTION DERM: LOCATION SIMPLE: RIGHT HAND

## 2019-12-04 ASSESSMENT — LOCATION ZONE DERM: LOCATION ZONE: HAND

## 2019-12-04 ASSESSMENT — LOCATION DETAILED DESCRIPTION DERM: LOCATION DETAILED: RIGHT RADIAL DORSAL HAND

## 2019-12-10 RX ORDER — LISINOPRIL 20 MG/1
TABLET ORAL
Qty: 90 TAB | Refills: 1 | Status: SHIPPED | OUTPATIENT
Start: 2019-12-10 | End: 2020-05-11 | Stop reason: SDUPTHER

## 2020-01-22 ENCOUNTER — APPOINTMENT (RX ONLY)
Dept: URBAN - METROPOLITAN AREA CLINIC 35 | Facility: CLINIC | Age: 65
Setting detail: DERMATOLOGY
End: 2020-01-22

## 2020-01-22 DIAGNOSIS — D22 MELANOCYTIC NEVI: ICD-10-CM

## 2020-01-22 DIAGNOSIS — Z85.828 PERSONAL HISTORY OF OTHER MALIGNANT NEOPLASM OF SKIN: ICD-10-CM

## 2020-01-22 DIAGNOSIS — Z71.89 OTHER SPECIFIED COUNSELING: ICD-10-CM

## 2020-01-22 DIAGNOSIS — Z48.02 ENCOUNTER FOR REMOVAL OF SUTURES: ICD-10-CM

## 2020-01-22 DIAGNOSIS — L57.0 ACTINIC KERATOSIS: ICD-10-CM

## 2020-01-22 DIAGNOSIS — L82.1 OTHER SEBORRHEIC KERATOSIS: ICD-10-CM

## 2020-01-22 DIAGNOSIS — L81.4 OTHER MELANIN HYPERPIGMENTATION: ICD-10-CM

## 2020-01-22 PROBLEM — D22.5 MELANOCYTIC NEVI OF TRUNK: Status: ACTIVE | Noted: 2020-01-22

## 2020-01-22 PROCEDURE — ? TREATMENT REGIMEN

## 2020-01-22 PROCEDURE — 99213 OFFICE O/P EST LOW 20 MIN: CPT

## 2020-01-22 PROCEDURE — ? COUNSELING

## 2020-01-22 PROCEDURE — ? PRESCRIPTION

## 2020-01-22 PROCEDURE — ? ADDITIONAL NOTES

## 2020-01-22 RX ORDER — FLUOROURACIL 5 MG/G
THIN LAYER CREAM TOPICAL BID
Qty: 1 | Refills: 0 | Status: ERX | COMMUNITY
Start: 2020-01-22

## 2020-01-22 RX ORDER — CALCIPOTRIENE 0.05 MG/G
THIN LAYER OINTMENT TOPICAL BID
Qty: 1 | Refills: 0 | Status: ERX | COMMUNITY
Start: 2020-01-22

## 2020-01-22 RX ADMIN — FLUOROURACIL THIN LAYER: 5 CREAM TOPICAL at 00:00

## 2020-01-22 RX ADMIN — CALCIPOTRIENE THIN LAYER: 0.05 OINTMENT TOPICAL at 00:00

## 2020-01-22 ASSESSMENT — LOCATION SIMPLE DESCRIPTION DERM
LOCATION SIMPLE: UPPER BACK
LOCATION SIMPLE: ABDOMEN
LOCATION SIMPLE: RIGHT CHEEK
LOCATION SIMPLE: LEFT ANKLE
LOCATION SIMPLE: LEFT CHEEK
LOCATION SIMPLE: LEFT FOREARM
LOCATION SIMPLE: RIGHT PRETIBIAL REGION
LOCATION SIMPLE: RIGHT HAND
LOCATION SIMPLE: LEFT PRETIBIAL REGION
LOCATION SIMPLE: CHEST
LOCATION SIMPLE: RIGHT BACK
LOCATION SIMPLE: SUPERIOR FOREHEAD
LOCATION SIMPLE: LEFT SHOULDER
LOCATION SIMPLE: RIGHT FOREARM
LOCATION SIMPLE: RIGHT CALF

## 2020-01-22 ASSESSMENT — LOCATION DETAILED DESCRIPTION DERM
LOCATION DETAILED: RIGHT SUPERIOR LATERAL UPPER BACK
LOCATION DETAILED: RIGHT RADIAL DORSAL HAND
LOCATION DETAILED: RIGHT DISTAL PRETIBIAL REGION
LOCATION DETAILED: LEFT CENTRAL MALAR CHEEK
LOCATION DETAILED: EPIGASTRIC SKIN
LOCATION DETAILED: LEFT POSTERIOR SHOULDER
LOCATION DETAILED: RIGHT DISTAL DORSAL FOREARM
LOCATION DETAILED: LEFT DISTAL RADIAL DORSAL FOREARM
LOCATION DETAILED: INFERIOR THORACIC SPINE
LOCATION DETAILED: LEFT POSTERIOR ANKLE
LOCATION DETAILED: SUPERIOR MID FOREHEAD
LOCATION DETAILED: RIGHT INFERIOR CENTRAL MALAR CHEEK
LOCATION DETAILED: MIDDLE STERNUM
LOCATION DETAILED: RIGHT DISTAL CALF
LOCATION DETAILED: LEFT DISTAL PRETIBIAL REGION

## 2020-01-22 ASSESSMENT — LOCATION ZONE DERM
LOCATION ZONE: FACE
LOCATION ZONE: HAND
LOCATION ZONE: TRUNK
LOCATION ZONE: LEG
LOCATION ZONE: ARM

## 2020-01-22 NOTE — PROCEDURE: TREATMENT REGIMEN
Initiate Treatment: Discussed field treatment with blue light vs cream treatment with Fluorouracil 5% bid for 4 days and calcipotriene 0.005% ointment bid for 4 days.\\nWill plan on cream treatment
Detail Level: Zone

## 2020-01-22 NOTE — PROCEDURE: ADDITIONAL NOTES
Detail Level: Detailed
Additional Notes: Recommended applying hydrogen peroxide daily for the next 3 days

## 2020-05-11 DIAGNOSIS — I10 ESSENTIAL HYPERTENSION: ICD-10-CM

## 2020-05-11 RX ORDER — LISINOPRIL 20 MG/1
20 TABLET ORAL
Qty: 90 TAB | Refills: 2 | Status: SHIPPED | OUTPATIENT
Start: 2020-05-11 | End: 2020-05-18 | Stop reason: SDUPTHER

## 2020-05-11 NOTE — TELEPHONE ENCOUNTER
Received request via: Pharmacy    Was the patient seen in the last year in this department? Yes  11/14/19  Does the patient have an active prescription (recently filled or refills available) for medication(s) requested? No

## 2020-05-18 DIAGNOSIS — I10 ESSENTIAL HYPERTENSION: ICD-10-CM

## 2020-05-19 NOTE — TELEPHONE ENCOUNTER
Received request via: Patient    Was the patient seen in the last year in this department? Yes  11/24/2019  Does the patient have an active prescription (recently filled or refills available) for medication(s) requested? No

## 2020-05-21 RX ORDER — LISINOPRIL 20 MG/1
20 TABLET ORAL
Qty: 90 TAB | Refills: 3 | Status: SHIPPED | OUTPATIENT
Start: 2020-05-21

## 2021-03-15 DIAGNOSIS — Z23 NEED FOR VACCINATION: ICD-10-CM

## 2022-10-18 ENCOUNTER — APPOINTMENT (RX ONLY)
Dept: URBAN - METROPOLITAN AREA CLINIC 53 | Facility: CLINIC | Age: 67
Setting detail: DERMATOLOGY
End: 2022-10-18

## 2022-10-18 DIAGNOSIS — D22 MELANOCYTIC NEVI: ICD-10-CM

## 2022-10-18 DIAGNOSIS — L81.4 OTHER MELANIN HYPERPIGMENTATION: ICD-10-CM

## 2022-10-18 DIAGNOSIS — Z85.828 PERSONAL HISTORY OF OTHER MALIGNANT NEOPLASM OF SKIN: ICD-10-CM | Status: RESOLVED

## 2022-10-18 DIAGNOSIS — L82.1 OTHER SEBORRHEIC KERATOSIS: ICD-10-CM

## 2022-10-18 PROBLEM — D22.9 MELANOCYTIC NEVI, UNSPECIFIED: Status: ACTIVE | Noted: 2022-10-18

## 2022-10-18 PROCEDURE — ? FULL BODY SKIN EXAM

## 2022-10-18 PROCEDURE — 99203 OFFICE O/P NEW LOW 30 MIN: CPT

## 2022-10-18 PROCEDURE — ? COUNSELING

## 2022-10-18 PROCEDURE — ? SUNSCREEN TREATMENT REGIMEN

## 2023-04-18 ENCOUNTER — APPOINTMENT (RX ONLY)
Dept: URBAN - METROPOLITAN AREA CLINIC 53 | Facility: CLINIC | Age: 68
Setting detail: DERMATOLOGY
End: 2023-04-18

## 2023-04-18 DIAGNOSIS — L82.1 OTHER SEBORRHEIC KERATOSIS: ICD-10-CM

## 2023-04-18 DIAGNOSIS — D18.0 HEMANGIOMA: ICD-10-CM

## 2023-04-18 DIAGNOSIS — Z85.828 PERSONAL HISTORY OF OTHER MALIGNANT NEOPLASM OF SKIN: ICD-10-CM | Status: RESOLVED

## 2023-04-18 DIAGNOSIS — L81.4 OTHER MELANIN HYPERPIGMENTATION: ICD-10-CM

## 2023-04-18 PROBLEM — D18.01 HEMANGIOMA OF SKIN AND SUBCUTANEOUS TISSUE: Status: ACTIVE | Noted: 2023-04-18

## 2023-04-18 PROCEDURE — ? FULL BODY SKIN EXAM

## 2023-04-18 PROCEDURE — 99213 OFFICE O/P EST LOW 20 MIN: CPT

## 2023-04-18 PROCEDURE — ? PRESCRIPTION

## 2023-04-18 PROCEDURE — ? COUNSELING

## 2023-04-18 PROCEDURE — ? SUNSCREEN RECOMMENDATIONS

## 2023-04-18 RX ORDER — HYDROQUINONE 4 %
CREAM (GRAM) TOPICAL
Qty: 1 | Refills: 4 | Status: ERX | COMMUNITY
Start: 2023-04-18

## 2023-04-18 RX ADMIN — Medication: at 00:00

## 2023-04-18 ASSESSMENT — LOCATION DETAILED DESCRIPTION DERM
LOCATION DETAILED: RIGHT ANTERIOR PROXIMAL THIGH
LOCATION DETAILED: EPIGASTRIC SKIN
LOCATION DETAILED: RIGHT PROXIMAL POSTERIOR UPPER ARM
LOCATION DETAILED: SUPERIOR LUMBAR SPINE
LOCATION DETAILED: LEFT ANTERIOR PROXIMAL THIGH
LOCATION DETAILED: RIGHT ANTERIOR DISTAL THIGH
LOCATION DETAILED: LEFT ANTERIOR PROXIMAL UPPER ARM
LOCATION DETAILED: LEFT PROXIMAL POSTERIOR UPPER ARM
LOCATION DETAILED: RIGHT ANTERIOR MEDIAL PROXIMAL THIGH
LOCATION DETAILED: RIGHT ANTERIOR PROXIMAL UPPER ARM
LOCATION DETAILED: LEFT SUPERIOR CENTRAL BUCCAL CHEEK

## 2023-04-18 ASSESSMENT — LOCATION SIMPLE DESCRIPTION DERM
LOCATION SIMPLE: LEFT THIGH
LOCATION SIMPLE: RIGHT THIGH
LOCATION SIMPLE: RIGHT POSTERIOR UPPER ARM
LOCATION SIMPLE: LOWER BACK
LOCATION SIMPLE: RIGHT UPPER ARM
LOCATION SIMPLE: LEFT CHEEK
LOCATION SIMPLE: LEFT POSTERIOR UPPER ARM
LOCATION SIMPLE: ABDOMEN
LOCATION SIMPLE: LEFT UPPER ARM

## 2023-04-18 ASSESSMENT — LOCATION ZONE DERM
LOCATION ZONE: LEG
LOCATION ZONE: ARM
LOCATION ZONE: FACE
LOCATION ZONE: TRUNK